# Patient Record
Sex: MALE | Race: WHITE | NOT HISPANIC OR LATINO | Employment: OTHER | URBAN - METROPOLITAN AREA
[De-identification: names, ages, dates, MRNs, and addresses within clinical notes are randomized per-mention and may not be internally consistent; named-entity substitution may affect disease eponyms.]

---

## 2024-05-28 ENCOUNTER — TRANSFERRED RECORDS (OUTPATIENT)
Dept: HEALTH INFORMATION MANAGEMENT | Facility: CLINIC | Age: 84
End: 2024-05-28
Payer: COMMERCIAL

## 2024-06-04 ENCOUNTER — MEDICAL CORRESPONDENCE (OUTPATIENT)
Dept: HEALTH INFORMATION MANAGEMENT | Facility: CLINIC | Age: 84
End: 2024-06-04
Payer: COMMERCIAL

## 2024-06-10 ENCOUNTER — TRANSCRIBE ORDERS (OUTPATIENT)
Dept: OTHER | Age: 84
End: 2024-06-10

## 2024-06-10 DIAGNOSIS — K58.1 IRRITABLE BOWEL SYNDROME WITH CONSTIPATION: Primary | ICD-10-CM

## 2024-06-11 ENCOUNTER — TELEPHONE (OUTPATIENT)
Dept: GASTROENTEROLOGY | Facility: CLINIC | Age: 84
End: 2024-06-11
Payer: COMMERCIAL

## 2024-06-11 ENCOUNTER — TELEPHONE (OUTPATIENT)
Dept: GASTROENTEROLOGY | Facility: CLINIC | Age: 84
End: 2024-06-11

## 2024-06-11 NOTE — TELEPHONE ENCOUNTER
[facility] Call [facility]    Phone Message    May a detailed message be left on voicemail: Yes    Reason for Call: Other: Patient is currently scheduled on 8/20/24, as visit type New GI Urgent. This is outside the expected timeline for this referral. Patient has been added to the waitlist.      Action Taken: Message routed to:  Other: GI REFERRAL TRIAGE POOL     Travel Screening: Not Applicable

## 2024-07-16 NOTE — TELEPHONE ENCOUNTER
REFERRAL INFORMATION:  Referring Provider:  Dr. Che   Referring Clinic:  [facility].   Reason for Visit/Diagnosis: K58.1 (ICD-10-CM) - Irritable bowel syndrome with constipation      FUTURE VISIT INFORMATION:  Appointment Date: 8/20/24  Appointment Time:      NOTES STATUS DETAILS   OFFICE NOTE from Referring Provider Care Everywhere 7/8/24, 5/23/24, 5/9/24 + more in epic   OFFICE NOTE from Other Specialist Care Everywhere OV- 5/28/23-Ewa WOOTEN CNP    OV 2/1/24, 1/24/24-Dr. Powell + more in [facility]   MEDICATION LIST Internal         ENDOSCOPY  Care Everywhere 8/18/23-[facility]   ERCP Care Everywhere 1/4/12, 10/18/11-[facility]   PERTINENT LABS Care Everywhere    IMAGING (CT, MRI, EGD, MRCP, Small Bowel Follow Through/SBT, MR/CT Enterography) Care Everywhere [facility]:    CT abd/pel-5/17/24, 7/25/23    CT chest 5/17/24    XR swallow 7/10/23, 5/16/23     Records Requested     July 16, 2024 11:02 AM  [email or web address]   Facility  [facility]   Outcome Requested images

## 2024-07-18 NOTE — TELEPHONE ENCOUNTER
[facility] Call Center    Phone Message    May a detailed message be left on voicemail: yes     Reason for Call: Other: Pt has called in to return call about potentially moving the Pt's appt. Please call back as soon as possible.     Action Taken: Message routed to:  [facility] ([facility] GI    Travel Screening: Not Applicable     Date of Service:

## 2024-07-22 ENCOUNTER — DOCUMENTATION ONLY (OUTPATIENT)
Dept: GASTROENTEROLOGY | Facility: CLINIC | Age: 84
End: 2024-07-22
Payer: COMMERCIAL

## 2024-07-22 NOTE — PROGRESS NOTES
Imaging disc received from [facility]    Scan: CT Chest without contrast, DOS: 5-15-24    Scan: Ct Abdomen and Pelvis with cont , DOS: 5-15-24    Scan: CT abdomen and pelvis , DOS: 7-24-23    Scan: xray video swallow, DOS: 7-10-23    Scan: xray video swallow, DOS: 5-16-23    CD sent to [facility] to be uploaded into PACS.     Mariana Long LPN

## 2024-07-24 NOTE — TELEPHONE ENCOUNTER
Writer called Pt back. Pt accepted an appointment with Bebe Montgomery on 7/25/2024 at 7 AM for an in-person clinic visit. The clinic was confirmed and verified with the Pt.

## 2024-07-25 ENCOUNTER — OFFICE VISIT (OUTPATIENT)
Dept: GASTROENTEROLOGY | Facility: CLINIC | Age: 84
End: 2024-07-25
Attending: INTERNAL MEDICINE
Payer: COMMERCIAL

## 2024-07-25 VITALS
HEIGHT: 73 IN | OXYGEN SATURATION: 98 % | SYSTOLIC BLOOD PRESSURE: 107 MMHG | WEIGHT: 153 LBS | DIASTOLIC BLOOD PRESSURE: 71 MMHG | BODY MASS INDEX: 20.28 KG/M2 | HEART RATE: 74 BPM

## 2024-07-25 DIAGNOSIS — R13.10 DYSPHAGIA, UNSPECIFIED TYPE: ICD-10-CM

## 2024-07-25 DIAGNOSIS — R10.84 ABDOMINAL PAIN, GENERALIZED: ICD-10-CM

## 2024-07-25 DIAGNOSIS — K59.00 CONSTIPATION, UNSPECIFIED CONSTIPATION TYPE: ICD-10-CM

## 2024-07-25 DIAGNOSIS — R63.4 WEIGHT LOSS: Primary | ICD-10-CM

## 2024-07-25 DIAGNOSIS — K58.1 IRRITABLE BOWEL SYNDROME WITH CONSTIPATION: ICD-10-CM

## 2024-07-25 PROCEDURE — 99204 OFFICE O/P NEW MOD 45 MIN: CPT | Performed by: PHYSICIAN ASSISTANT

## 2024-07-25 RX ORDER — PANTOPRAZOLE SODIUM 40 MG/1
40 TABLET, DELAYED RELEASE ORAL 2 TIMES DAILY
COMMUNITY
Start: 2024-03-14 | End: 2025-03-19

## 2024-07-25 RX ORDER — MULTIVIT WITH MINERALS/LUTEIN
1000 TABLET ORAL DAILY
COMMUNITY

## 2024-07-25 ASSESSMENT — PAIN SCALES - GENERAL: PAINLEVEL: NO PAIN (0)

## 2024-07-25 NOTE — PROGRESS NOTES
GI CLINIC VISIT    CC/REFERRING MD:  Alexx Che  REASON FOR CONSULTATION: K58.1 (ICD-10-CM) - Irritable bowel syndrome with constipation     ASSESSMENT/PLAN:  Alvin Parra is a 84 year old year old functionally independent male with PMHx significant for prostate cancer s/p prostatectomy who presents w/ wife (who helps supply some collateral history) to est care with the  [facility] team for dysphagia, anorexia, weight loss and constipation. Consult came in for IBS with constipation.       #Dysphagia x progressive since Jan 2023  Evidence of oropharyngeal dysphagia (at risk for aspiration) on VFSS 7/2023 and underwent therapy with SLP team. Also describing sx suggestive of progressive esophageal dysphagia. Given weight loss picture, most concerning would be mass/malignancy with ddx to include other structural esophageal changes (webs, rings, strictures), dysmotility disorder, esophagitis (though thought less likely given intermittent heartburn sx). Sx could also be compounded by underlying mood disorder.     -XR eso timed with tab, advised to be done before EGD  -Rec'd priority EGD after PAC eval for causes . Indication, R/B, potential complications explained to patient.     Future consideration  HRM with 24 hour impedence testing for further dysmotility disorders   Neurology consult - central process?     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 External CTC WO contrast with b/l pulm nodules with recommended follow up in 3 mo for low/high risk individuals . He will follow CTC findings with his primary. He does not recall his last colonoscopy. Sx are most concerning for malignancy of some sort. Ddx to include known DGBI, gastroparesis,SIBO, mood disorder,     -I recommended bidirectional scopes, priority, for further eval after PAC appt . Indication, R/B, potential  "complications explained to patient.   -dietary consult placed   -asked he start daily nutritional supplement   -asked he start daily miralax per AVS     Future consideration  Considering eval of stable appearing upper abd mass, likely MR abdomen to start   Further eval of constipation. TSH and metabolic panel both WNL in past 6 mo. Consider celiac serology, sitz marker study, possible pelvic floor eval     Orders Placed This Encounter   Procedures    XR Esophagram    Adult GI  Referral - Procedure Only    PAC Visit Referral (For [facility] Only)    Adult Nutrition  Referral     RTC 1-2 weeks after scopes     Thank you for this consultation.  It was a pleasure to participate in the care of this patient; please contact me with any further questions.     Bebe Montgomery PA-C  Code per complexity     HPI  Alvin Parra is a 84 year old year old male with PMHx of prostate cancer s/p prostatectomy presenting to seeing the [facility] GI group for weight loss, dysphagia, constipation.     1. GI history and weight loss    Since aged 18 reported GI troubles - sore stomach (mid abd), nausea, painful gas   -typically belches and burps and the pain goes away  -in past 12 mo, reports he lost 30# unintentionally, they feel this started after his COVID infection . Normal 180#, down to 153-155# now. Today at 153  -having abd pain that stops his meals , in addition to ongoing poor appetite   5/2024 - CTAP W contrast - w/ finding of a \"stable hypodense mass within the anterior upper abdomen/omentum. Partially   calcified gastric duplication cyst?\"  5/2024 - CTC WO contrast - b/l pulm nodules with recommendation for follow up CT in 3 mo for both low and high risk individuals   -brother and sister both with \"nervous stomachs\" but no known GI cancers     2. Dysphagia - new since summer 2023, progressive since then as well  -started as large pills then progressed to liquids and solids  -reports phlegm in his throat   -at times needs " to spit up the swallowed food as he feels it getting stuck  -intermittent heartburn that is responsive to OTC gaviscon, no known triggers   -started drooling about 1 year ago, this occurs independent of eating   -reports ongoing fatigue  -7/10/23 had an abnl VFSS and underwent some therapy, see their eval below  Impression   Oral phase of swallow: Mild impairment but improved from last study  Pharyngeal phase of swallow: Moderate impairment but slightly improved cricopharyngeal function from last study resulting in fewer residuals in pyriform sinus. Epiglottic inversion remains reduced resulting in significant residuals in valleculae.  Suspected esophageal dysphagia: yes; history of reflux  Overall, the patient displayed some improvement in his swallowing function, however he did aspirate during this study x1 and remains at risk of aspiration due to pharyngeal residuals. This likely contributes to production of phlegm.    Recommendations   The following recommendations are based upon today's assessment and may reduce, but cannot entirely eliminate this patient's risk of aspiration: Diet textures: Thin Liquids and Level 6 Soft & Bite Sized (NDD3)     Plan of Care   No further Speech intervention is recommended at this time due to limited improvements from previous swallow study and continued risk of aspiration.     3. Long-standing constipation that he reports had improved some after he increased his water intake   -not currently on any bowel regiment  -typically passing a BM every other day, short soft segments after straining  -no blood or black stools  CTAP W Contrast 5/2024 -   IMPRESSION:   1.   Stool distended rectum. Moderate to large amount of stool within the   colon. Does the patient have a history of constipation? Additional bowel   findings described above.   -He does not recall his last Cscope     Functionally independent  Works out 3x weekly  Volunteers at food shelter 2x weekly  Fatigue is affecting  QoL - unable to carry out IALDs as he normally would do - needing more breaks in between     Family Hx  No other known family history or GI related malignancy (esophageal, gastric, pancreatic, liver or colon) or family history of IBD/celiac disease.     Social Hx     No  use of NSAIDs or Tylenol.     1-2 drinks every other night of ETOH  No tobacco products.   No No recreational drug use.     PROBLEM LIST  There are no problems to display for this patient.      PERTINENT PAST MEDICAL HISTORY:  Past Medical History:   Diagnosis Date    H/O prostatectomy     Prostate cancer (H)        PREVIOUS SURGERIES:  Past Surgical History:   Procedure Laterality Date    PROSTATECTOMY PERINEAL  2011       ALLERGIES:     Allergies   Allergen Reactions    Atorvastatin     Azithromycin     Fenofibrate     Penicillins     Simvastatin        PERTINENT MEDICATIONS:    Current Outpatient Medications:     pantoprazole (PROTONIX) 40 MG EC tablet, Take 40 mg by mouth 2 times daily, Disp: , Rfl:     vitamin E (TOCOPHEROL) 1000 units (450 mg) CAPS capsule, Take 1,000 Units by mouth daily, Disp: , Rfl:     IBUPROFEN PO, Take by mouth as needed for moderate pain (Patient not taking: Reported on 7/25/2024), Disp: , Rfl:     VITAMIN D, CHOLECALCIFEROL, PO, Take by mouth daily (Patient not taking: Reported on 7/25/2024), Disp: , Rfl:     SOCIAL HISTORY:  Social History     Socioeconomic History    Marital status:      Spouse name: Not on file    Number of children: Not on file    Years of education: Not on file    Highest education level: Not on file   Occupational History    Not on file   Tobacco Use    Smoking status: Never    Smokeless tobacco: Never   Substance and Sexual Activity    Alcohol use: Yes     Comment: occasional    Drug use: No    Sexual activity: Not on file   Other Topics Concern    Not on file   Social History Narrative    Not on file     Social Determinants of Health     Financial Resource Strain: Not on file   Food  "Insecurity: Not on file   Transportation Needs: Not on file   Physical Activity: Sufficiently Active (2/18/2021)    [facility]    Exercise Vital Sign     Days of Exercise per Week: 3 days     Minutes of Exercise per Session: 90 min   Stress: Not on file   Social Connections: Not on file   Interpersonal Safety: Not on file   Housing Stability: Not on file       FAMILY HISTORY:  Family History   Problem Relation Age of Onset    Family History Negative Unknown        Past/family/social history reviewed and no changes    PHYSICAL EXAMINATION:  Vitals reviewed: /71   Pulse 74   Ht 1.854 m (6' 1\")   Wt 69.4 kg (153 lb)   SpO2 98%   BMI 20.19 kg/m    Wt:   Wt Readings from Last 2 Encounters:   07/25/24 69.4 kg (153 lb)   09/11/14 82.6 kg (182 lb)      Constitutional: aaox3, cooperative, pleasant, not dyspneic/diaphoretic, no acute distress  Eyes: Sclera anicteric/injected  Ears/nose/mouth/throat: hearing intact  Neck: supple, active ROM w/o limitation or pain   CV: No edema  Respiratory: Unlabored breathing  Abd:  Nondistended, +bs, no hepatosplenomegaly, nontender, no peritoneal signs  Skin: warm, perfused, no jaundice  Psych: Normal affect  MSK: Normal gait     PERTINENT STUDIES:    No results found for any previous visit.        No results found for: \"WBC\", \"HGB\", \"PLT\", \"CHOL\", \"TRIG\", \"HDL\", \"LDLDIRECT\", \"ALT\", \"AST\", \"NA\", \"CREATININE\", \"BUN\", \"CO2\", \"TSH\", \"INR\", \"GLUF\"     Liver Function Studies - No results for input(s): \"PROTTOTAL\", \"ALBUMIN\", \"BILITOTAL\", \"ALKPHOS\", \"AST\", \"ALT\", \"BILIDIRECT\" in the last 92019 hours.     517/24  Liver: Unremarkable.   Gallbladder and bile ducts: The patient has had a cholecystectomy. There is a   mild, expected degree of intrahepatic bile duct and common duct dilation post   cholecystectomy which appears similar to 7/24/2023.   Pancreas: Unremarkable.   Spleen: Unremarkable spleen. An accessory splenule is present.   Adrenal glands: " Unremarkable adrenal glands.   Kidneys and ureters: Right kidney: There is persistent dilatation of the   extrarenal pelvis which may be due to chronic ureteropelvic junction   obstruction. Otherwise unremarkable.The ureter is poorly seen. Left kidney:   Unremarkable. Visualized ureter appears unremarkable.   Stomach and bowel: There are several surgical clips at the gastroesophageal   junction.Borderline distended distal descending duodenum/proximal 3rd portion   of the duodenum.Narrowing of the 3rd portion of the duodenum between the   abdominal aorta and the SMA/SMV without evidence for high-grade obstruction at   this level. There is enteric contrast within the small bowel. Rectum is   distended with stool measuring up to 7.8 cm. There is colonic diverticulosis.   Moderate to large amount of stool within the colon.   Appendix: Not visualized. Reportedly appendectomy.     Intraperitoneal space: Within the anterior abdomen/omentum there is a nodular   density with marginal calcification measuring 2.5 x 1.5 x 3 cm which appears   stable on axial image 51/4. This is contiguous with the anterior inferior   distal stomach also similar to the prior study.   Vasculature: Atherosclerotic changes to include the nonaneurysmal abdominal   aorta. Atherosclerotic and stenotic origin of the celiac axis.   Lymph nodes: Unremarkable.   Urinary bladder: Unremarkable.   Reproductive: Absent prostate gland. Several surgical clips within the pelvis.   Bones/joints: There are degenerative changes within the spine. Stable grade 1   L4-L5 spondylolisthesis. Stable mild wedging of L1. Spinal stenosis L4-L5.   Soft tissues: Small fatty bilateral inguinal hernias.     IMPRESSION:   1.   Stool distended rectum. Moderate to large amount of stool within the   colon. Does the patient have a history of constipation? Additional bowel   findings described above.   2.   Absent prostate.   3.   Stable hypodense mass within the anterior upper  abdomen/omentum. Partially   calcified gastric duplication cyst?   4.   Right renal changes described above.   5.   Additional non-critical/less critical findings as described in the report.     ULTRASOUND-GUIDED PERCUTANEOUS ABDOMINAL MASS BIOPSY: 8/8/2023 10:05 CDT   INDICATION: ICD-10 R19.00 Abdominal mass, unspecified abdominal location   IMPRESSION: Successful ultrasound-guided abdominal wall mass percutaneous core biopsy without complication. The mass appeared to be in the region of the patient's previous hernia repair scar and could reflect a region of fat necrosis. Biopsy results will dictate further management.       PREVIOUS ENDOSCOPY    No results found for this or any previous visit.

## 2024-07-25 NOTE — PATIENT INSTRUCTIONS
It was a pleasure taking care of you today.  I've included a brief summary of our discussion and care plan from today's visit below.  Please review this information with your primary care provider.  _______________________________________________________________________    My recommendations are summarized as follows:    Order x-ray of esophagus, please get this done before the upper endoscopy. Call to schedule at [phone]   Ordering upper and lower endoscopy - please call to schedule at [phone] option 2  Agree with increasing water intake as you are  If you do OK on it (do not develop pain,diarrhea,bloat), OK to add on daily boost/ensure as well   Referral to dietary - this may not be covered by insurance. Please call your plan to clarify - thank you   It would be OK to add on a low dose daily Miralax such as 0.5 caps a day to help with the stooling     Please call our clinic or send a [employer] message to us if you have any questions or concerns. MyChart messages are answered by your nurse or doctor typically within 24 hours.  Please allow extra time on weekends and holidays    Return to GI Clinic in 1-2 weeks after procedures to review your progress.    _______________________________________________________________________    How do I schedule labs, imaging studies, or procedures that were ordered in clinic today?      Labs: To schedule lab appointment at the [facility], use my chart or call [phone]. If you have a [place] lab closer to home where you are regularly seen you can give them a call.      Procedures: If a colonoscopy, upper endoscopy, breath test, esophageal manometry, or pH impedence was ordered today, our endoscopy team will call you to schedule this. If you have not heard from our endoscopy team within a week, please call [phone] option 2 to schedule.      Imaging Studies: If you were scheduled for a CT scan, X-ray, MRI, ultrasound, HIDA scan or other imaging  study, please call [phone] to have this scheduled.      Referral: If a referral to another specialty was ordered, expect a phone call or follow instructions above. If you have not heard from anyone regarding your referral in a week, please call our clinic to check the status.      Who do I call with any questions after my visit?  Please be in touch if there are any further questions that arise following today's visit.  There are multiple ways to contact your gastroenterology care team.       During business hours, you may reach a Gastroenterology nurse at [phone]     To schedule or reschedule an appointment, please call [phone].      You can always send a secure message through [employer].  [employer] messages are answered by your nurse or doctor typically within 24 hours.  Please allow extra time on weekends and holidays.       For urgent/emergent questions after business hours, you may reach the on-call GI Fellow by contacting the [facility]  at [phone].     How will I get the results of any tests ordered?    You will receive all of your results.  If you have signed up for [employer], any tests ordered at your visit will be available to you after your physician reviews them.  Typically this takes 1-2 weeks.  If there are urgent results that require a change in your care plan, your physician or nurse will call you to discuss the next steps.       What is [employer]?  [employer] is a secure way for you to access all of your healthcare records from the [facility].  It is a web based computer program, so you can sign on to it from any location.  It also allows you to send secure messages to your care team.  I recommend signing up for [employer] access if you have not already done so and are comfortable with using a computer.       How to I schedule a follow-up visit?  If you did not schedule a follow-up visit today, please call [phone] to schedule a follow-up office visit.       Sincerely,    Bebe Montgomery PA-C  Gastroenterology

## 2024-07-25 NOTE — NURSING NOTE
"Chief Complaint   Patient presents with    New Patient       Vitals:    07/25/24 0654   BP: 107/71   Pulse: 74   SpO2: 98%   Weight: 69.4 kg (153 lb)   Height: 1.854 m (6' 1\")       Body mass index is 20.19 kg/m .    [employer]    "

## 2024-07-29 ENCOUNTER — TELEPHONE (OUTPATIENT)
Dept: GASTROENTEROLOGY | Facility: CLINIC | Age: 84
End: 2024-07-29
Payer: COMMERCIAL

## 2024-07-29 NOTE — TELEPHONE ENCOUNTER
"Endoscopy Scheduling Screen    Have you had a positive Covid test in the last 14 days?  No    What is your communication preference for Instructions and/or Bowel Prep?   Mail/USPS    What insurance is in the chart?  Other:  BCBS    Ordering/Referring Provider:     ERIBERTO SALGUERO      (If ordering provider performs procedure, schedule with ordering provider unless otherwise instructed. )    BMI: Estimated body mass index is 20.19 kg/m  as calculated from the following:    Height as of 7/25/24: 1.854 m (6' 1\").    Weight as of 7/25/24: 69.4 kg (153 lb).     Sedation Ordered  MAC/deep sedation.   BMI<= 45 45 < BMI <= 48 48 < BMI < = 50  BMI > 50   No Restrictions No [facility]  No [facility]  [facility] [facility] with exceptions Hospital Only OR Only       Do you have a history of malignant hyperthermia?  No    (Females) Are you currently pregnant?        Have you been diagnosed or told you have pulmonary hypertension?   No    Do you have an LVAD?  No    Have you been told you have moderate to severe sleep apnea?  No    Have you been told you have COPD, asthma, or any other lung disease?  No    Do you have any heart conditions?  No     Have you ever had or are you waiting for an organ transplant?  No. Continue scheduling, no site restrictions.    Have you had a stroke or transient ischemic attack (TIA aka \"mini stroke\" in the last 6 months?   No    Have you been diagnosed with or been told you have cirrhosis of the liver?   No    Are you currently on dialysis?   No    Do you need assistance transferring?   No    BMI: Estimated body mass index is 20.19 kg/m  as calculated from the following:    Height as of 7/25/24: 1.854 m (6' 1\").    Weight as of 7/25/24: 69.4 kg (153 lb).     Is patients BMI > 40 and scheduling location [facility]?  No    Do you take an injectable medication for weight loss or diabetes (excluding insulin)?  No    Do you take the medication Naltrexone?  No    Do you take blood thinners?  No       Prep   Are you currently on " dialysis or do you have chronic kidney disease?  No    Do you have a diagnosis of diabetes?  No    Do you have a diagnosis of cystic fibrosis (CF)?  No    On a regular basis do you go 3 -5 days between bowel movements?  No    BMI > 40?  No    Preferred Pharmacy:    [id number] - [place], [place] - [address]  [address] [place] [address]  Phone: [phone] Fax: [phone]      Final Scheduling Details     Procedure scheduled  Colonoscopy / Upper endoscopy (EGD)    Surgeon:       Date of procedure: ON WAITLIST PER THISA       Pre-OP / PAC:       Location       Sedation          Patient Reminders:   You will receive a call from a Nurse to review instructions and health history.  This assessment must be completed prior to your procedure.  Failure to complete the Nurse assessment may result in the procedure being cancelled.      On the day of your procedure, please designate an adult(s) who can drive you home stay with you for the next 24 hours. The medicines used in the exam will make you sleepy. You will not be able to drive.      You cannot take public transportation, ride share services, or non-medical taxi service without a responsible caregiver.  Medical transport services are allowed with the requirement that a responsible caregiver will receive you at your destination.  We require that drivers and caregivers are confirmed prior to your procedure.

## 2024-07-30 ENCOUNTER — ANCILLARY PROCEDURE (OUTPATIENT)
Dept: GENERAL RADIOLOGY | Facility: CLINIC | Age: 84
End: 2024-07-30
Attending: PHYSICIAN ASSISTANT
Payer: COMMERCIAL

## 2024-07-30 DIAGNOSIS — K59.00 CONSTIPATION, UNSPECIFIED CONSTIPATION TYPE: ICD-10-CM

## 2024-07-30 DIAGNOSIS — R10.84 ABDOMINAL PAIN, GENERALIZED: ICD-10-CM

## 2024-07-30 DIAGNOSIS — R63.4 WEIGHT LOSS: ICD-10-CM

## 2024-07-30 DIAGNOSIS — R13.10 DYSPHAGIA, UNSPECIFIED TYPE: ICD-10-CM

## 2024-07-30 PROCEDURE — 74220 X-RAY XM ESOPHAGUS 1CNTRST: CPT | Mod: GC | Performed by: RADIOLOGY

## 2024-08-02 DIAGNOSIS — R13.10 DYSPHAGIA, UNSPECIFIED TYPE: Primary | ICD-10-CM

## 2024-08-05 ENCOUNTER — PATIENT OUTREACH (OUTPATIENT)
Dept: GASTROENTEROLOGY | Facility: CLINIC | Age: 84
End: 2024-08-05
Payer: COMMERCIAL

## 2024-08-05 NOTE — PROGRESS NOTES
Received the below message from Bebe WONG     Can you let the patient know the structures of the esophagus looked good. He was not able to perform the tablet study.     I want to get a video swallow study to check the initial swallow. He will be contacted for an appt.     Please remind him to schedule the upper endoscopy.     Left a voicemail for Alvin and provided call back number

## 2024-08-05 NOTE — PROGRESS NOTES
Received a call back from Alvin and provided the results from Bebe WONG.   He will call to schedule the procedures

## 2024-08-20 ENCOUNTER — PRE VISIT (OUTPATIENT)
Dept: GASTROENTEROLOGY | Facility: CLINIC | Age: 84
End: 2024-08-20

## 2024-09-06 ENCOUNTER — ANCILLARY PROCEDURE (OUTPATIENT)
Dept: GENERAL RADIOLOGY | Facility: CLINIC | Age: 84
End: 2024-09-06
Attending: PHYSICIAN ASSISTANT
Payer: COMMERCIAL

## 2024-09-06 ENCOUNTER — THERAPY VISIT (OUTPATIENT)
Dept: SPEECH THERAPY | Facility: CLINIC | Age: 84
End: 2024-09-06
Attending: PHYSICIAN ASSISTANT
Payer: COMMERCIAL

## 2024-09-06 ENCOUNTER — PATIENT OUTREACH (OUTPATIENT)
Dept: GASTROENTEROLOGY | Facility: CLINIC | Age: 84
End: 2024-09-06

## 2024-09-06 ENCOUNTER — TELEPHONE (OUTPATIENT)
Dept: GASTROENTEROLOGY | Facility: CLINIC | Age: 84
End: 2024-09-06

## 2024-09-06 DIAGNOSIS — R13.10 DYSPHAGIA, UNSPECIFIED TYPE: ICD-10-CM

## 2024-09-06 DIAGNOSIS — R10.9 ABDOMINAL PAIN, UNSPECIFIED ABDOMINAL LOCATION: ICD-10-CM

## 2024-09-06 DIAGNOSIS — R63.4 WEIGHT LOSS: Primary | ICD-10-CM

## 2024-09-06 DIAGNOSIS — K59.00 CONSTIPATION, UNSPECIFIED CONSTIPATION TYPE: ICD-10-CM

## 2024-09-06 PROCEDURE — 74230 X-RAY XM SWLNG FUNCJ C+: CPT | Mod: GC | Performed by: STUDENT IN AN ORGANIZED HEALTH CARE EDUCATION/TRAINING PROGRAM

## 2024-09-06 PROCEDURE — 92611 MOTION FLUOROSCOPY/SWALLOW: CPT | Mod: GN | Performed by: SPEECH-LANGUAGE PATHOLOGIST

## 2024-09-06 RX ORDER — BARIUM SULFATE 400 MG/ML
SUSPENSION ORAL ONCE
Status: COMPLETED | OUTPATIENT
Start: 2024-09-06 | End: 2024-09-06

## 2024-09-06 RX ADMIN — BARIUM SULFATE 240 ML: 400 SUSPENSION ORAL at 10:13

## 2024-09-06 NOTE — PROGRESS NOTES
SPEECH LANGUAGE PATHOLOGY EVALUATION        Fall Risk Screen:  Fall screen completed by: SLP  Have you fallen 2 or more times in the past year?: No  Have you fallen and had an injury in the past year?: No  Is patient a fall risk?: No    Subjective      Presenting condition or subjective complaint:  Pt is an 84 year old male that was seen by speech therapy today for a video swallow study. Pt was referred by GI. Pt has had two video swallow studies in 2023. He did participate in swallow therapy (vital stim) in 2023 and he did not feel improvement. Pt reports increased phlegm and occasional drooling. Pt only avoids spicy foods. He has lost 30lbs in the last year. Pt reports he starts eating and gets full. Pt reports he coughs all the time. He denies any recent upper respiratory infection. He believes his swallowing difficulties started with Covid in 2023. Pt reports that he does have difficulty swallowing large pills.     Date of onset: 09/06/24    Relevant medical history:   PMHx significant for prostate cancer s/p prostatectomy   Dates & types of surgery:      Prior diagnostic imaging/testing results:       VFSS 5/16/2023- IMPRESSION:   1. Mild retention of all fluid and food consistencies in the vallecula and piriform sinus after swallowing.   2. Cricopharyngeus dysfunction     VFSS 7/10/2023- IMPRESSION: No evidence of laryngeal penetration or aspiration with any of the test consistencies. Moderate vallecular stasis. This didn't get clear to some degree with swallowing. Please see separate dedicated speech pathology report for full details and dietary recommendations.     Prior therapy history for the same diagnosis, illness or injury:        Living Environment  Social support:     Help at home:    Equipment owned:       Employment:      Hobbies/Interests:      Patient goals for therapy:      Pain assessment: Pain denied     Objective     SWALLOW EVALUTION  Current Diet/Method of Nutritional Intake: thin liquids  (level 0), regular diet      VIDEOFLUOROSCOPIC SWALLOW STUDY  Radiologist: resident   Views Taken: left lateral, A/P   Physical location of procedure: [facility]  Patient sitting upright on chair/stool     VFSS textures trialed:   VFSS Eval: Thin Liquids  Mode of Presentation: cup, self-fed   Order of Presentation: 1, 2, 6, 9, 13 (AP)  Preparatory Phase: WFL  Oral Phase: WFL  Bolus Location When Swallow Initiated: posterior laryngeal surface of epiglottis  Pharyngeal Phase: impaired hyolaryngel excursion, impaired epiglottic movement, impaired pharyngoesophageal segment opening, impaired tongue base retraction, pharyngeal wall coating, residue in valleculae, residue in pyriform sinus  Rosenbeck's Penetration Aspiration Scale: 3 - contrast remains above the vocal cords, visible residue remains (penetration)  Response to Aspiration: absent response  Strategies and Compensations: not applicable  Diagnostic Statement: Decreased base of tongue. Noted prominent CP that decreases bolus flow through the UES. Resulting in penetration during the swallow and moderate residue in the vallecular and pyriforms. No aspiration. On AP, noted prominent narrowing at UES.     VFSS Eval: Mildly Thick Liquids  Mode of Presentation: cup, self-fed   Order of Presentation: 3  Preparatory Phase: WFL  Oral Phase: WFL  Bolus Location When Swallow Initiated: posterior angle of ramus  Pharyngeal Phase: impaired hyolaryngel excursion, impaired epiglottic movement, impaired pharyngoesophageal segment opening, impaired tongue base retraction, pharyngeal wall coating, residue in valleculae, residue in pyriform sinus  Rosenbeck's Penetration Aspiration Scale: 3 - contrast remains above the vocal cords, visible residue remains (penetration)  Response to Aspiration: absent response  Strategies and Compensations: not applicable  Diagnostic Statement:  Decreased base of tongue. Noted prominent CP that decreases bolus flow through the UES.  Resulting in penetration during the swallow and moderate residue in the vallecular and pyriforms. No aspiration.    VFSS Eval: Purees  Mode of Presentation: spoon, self-fed   Order of Presentation: 4, 5, 12 (AP)  Preparatory Phase: WFL  Oral Phase: WFL  Bolus Location When Swallow Initiated: valleculae  Pharyngeal Phase: impaired hyolaryngel excursion, impaired epiglottic movement, impaired pharyngoesophageal segment opening, impaired tongue base retraction, pharyngeal wall coating, residue in valleculae, residue in pyriform sinus  Rosenbeck s Penetration Aspiration Scale: 1 - no aspiration, contrast does not enter airway  Response to Aspiration: absent response  Strategies and Compensations: not applicable  Diagnostic Statement: Decreased base of tongue strength.  Noted prominent CP that decreases bolus flow through the UES. Moderate-severe residue in vallecular space down lateral channels to the UES. No penetration or aspiration.     VFSS Eval: Solids  Mode of Presentation: self-fed   Order of Presentation: 7, 8  Preparatory Phase: WFL  Oral Phase: WFL  Bolus Location When Swallow Initiated: posterior laryngeal surface of epiglottis  Pharyngeal Phase: impaired hyolaryngel excursion, impaired epiglottic movement, impaired pharyngoesophageal segment opening, impaired tongue base retraction, pharyngeal wall coating, residue in valleculae, residue in pyriform sinus   Rosenbeck s Penetration Aspiration Scale: 1 - no aspiration, contrast does not enter airway  Response to Aspiration: absent response  Strategies and Compensations: not applicable  Diagnostic Statement: Decreased base of tongue strength.  Noted prominent CP that decreases bolus flow through the UES. Moderate-severe residue in vallecular space down lateral channels to the UES. No penetration or aspiration.     VFSS Eval: Barium Tablet  Mode of Presentation: cup   Order of Presentation: 10, 11  Preparatory Phase: prolonged bolus preparation  Oral Phase:  impaired AP movement  Diagnostic Statement: Pt struggles to move pill AP in oral cavity. First attempt at 1/2 barium pill pt had to spit the pill out. On second attempt, pt was able to pass 1/2 barium table with apple sauce. Briefly suspended at UES.      ESOPHAGEAL PHASE OF SWALLOW  patient reports symptoms of esophageal dysphagia  patient presents with symptoms of esophageal dysphagia  esophageal sweep performed during today's videofluoroscopic exam  please refer to radiologist's report for details     SWALLOW ASSESSMENT CLINICAL IMPRESSIONS AND RATIONALE  Diet Consistency Recommendations: thin liquids (level 0), easy to chew (level 7)    Recommended Feeding/Eating Techniques: small bolus size, slow rate of intake, alternate food and liquid intake   Medication Administration Recommendations: pills with water or puree carrier  Instrumental Assessment Recommendations:  none      Assessment & Plan   CLINICAL IMPRESSIONS   Medical Diagnosis: Oropharyngeal dysphagia    Treatment Diagnosis: Oropharyngeal dysphagia   Impression/Assessment: Pt is a 84 year old male with swallowing complaints. The following significant findings have been identified: impaired swallowing, characterized by decreased base of tongue strength, decreased UES opening resulting in regurgitation of bolus, as well as decreased passage of bolus through oropharynx.  Moderate vallecular and pyriform residue. Pt had 2-3 swallows per bite/sip. Liquid wash results in penetration with thin liquids. No aspiration was observed today. Pt presents with inefficient swallow function. Identified deficits interfere with their ability to consume an oral diet and maintain nutrition as compared to previous level of function.Recommend soft, bite sized diet for increased efficiency of eating with thin liquids. Recommend ongoing use of safe swallow strategies, as well as choosing high protein shakes and increasing fluid intake to assist with fatigue while eating.  Recommend referral back to GI or ENT for UES dilation. Pt and wife were educated on the results of the swallow assessment today. Discussed reviewing VFSS at multidisciplinary swallow rounds and to follow up with treatment options. Pt and wife report understanding and agree with the plan of care.     PLAN OF CARE  Treatment Interventions:  Eval only     Recommended Referrals to Other Professionals:  GI and/or ENT  Education Assessment:   Learner/Method: Patient;Significant Other  Education Comments: Report understanding of information    Risks and benefits of evaluation/treatment have been explained.   Patient/Family/caregiver agrees with Plan of Care.     Evaluation Time:    SLP Eval: VideoFluoroscopic Swallow function Minutes (27038): 45         Signing Clinician: Barb BLACK [facility]                                                                                   OUTPATIENT SPEECH LANGUAGE PATHOLOGY

## 2024-09-06 NOTE — TELEPHONE ENCOUNTER
Received a call from Pinky Geiger(RN) inquiring about scheduling the pt's EGD/Colonoscopy. After reviewing the pt's chart we reached out to Bebe Montgomery PA-C to verify requirements for a PAC.     Per Bebe TURK: We no longer need a PAC to be done prior to the EGD/Colonoscopy. To ensure timely care we will be splitting the procedures to different days.     Messaging Dr. Beckwith to verify she can dilate the pt's esophageal sphincter.    Once approval has been given I will schedule pt to complete their EGD on 09/25 [facility].

## 2024-09-06 NOTE — TELEPHONE ENCOUNTER
1st attempt to schedule post [facility] review. LVM for patient to call back.     Slots on hold: 09/11/2024 [facility]   09/25 [facility] (Waiting for [facility] approval).    Schedule with MAC, No need for PAC eval per Bebe Montgomery.

## 2024-09-06 NOTE — LETTER
2024      Alvin Parra  [address]  [place] [address]              Low Volume Extended Bowel Prep  Prep instructions for your colonoscopy   For prep questions, please call: [facility] [address] [place] [address], [address]th Floor, [place] [address] - [phone] option 4    Bowel prep was sent 2024 to   [id number] - [place], [place] - [address]     Please read these instructions carefully at least 7 days prior to your colonoscopy procedure. Be sure to follow all directions completely. The inside of your colon must be clean to allow for a complete examination for the presence of any growths, polyps, and/or abnormalities, as well as their biopsy or removal. Several tips are included in order to make this part of the procedure as comfortable as possible.    Getting ready      prescription at the pharmacy. Endoscopy team will order this about 2 weeks before to your scheduled procedure. Included in the kit will be the followin  Dulcolax (Bisacodyl) 5mg tablets  8.3 ounce bottle of Miralax powder (ClearLAX, SmoothLAX, PowderLAX)  One container of Polyethylene glycol (Golytely, Colyte, Nulytely, Gavilyte-G)    A nurse will call you to go over your appointment details and prep instructions. Not completing the nurse call could result with your appointment being canceled.    You must arrange for an adult to drive you home after your exam. Your colonoscopy cannot be done unless you have a ride. If you need to use public transportation, someone must ride with you and stay with you for up to 24 hours.       7 days before procedure     Consult with your prescribing provider about stopping any:     Diabetic/Weight Loss Injectable Medication GLP-1 agonist (such as Exenatide (Byetta, Bydureon),  Mounjaro (Tirzepatide).  Ozempic (Semaglutide). Semaglutide. Symlin (Pamlintide), Tanzeum (Albiglutide). Tirzepatide-Weight Management (Zepbound), Trulicity  (Dulaglutide), Victoza (Saxenda, Liraglutide), Wegovy (Semaglutide) please follow below guidelines for holding:    For weekly injection HOLD 7 days before procedure.  For once or twice a day injection HOLD the day before procedure and day of procedure.  For oral, daily dosing (Rybelsus) HOLD 7 days before procedure.    Blood thinning and/or anti platelet medications: such as Coumadin, Plavix, Xarelto, Eliquis, Lovenox or others, these medications may need to be stopped temporarily before your procedure.     If you take insulin for diabetes, ask your prescribing provider for instructions on how to manage this medication while preparing for a colonoscopy.     NSAIDs medications such as Sulindac, Celebrex, Mobic, Relafen, or others may need to be stopped before the procedure. This will be discussed during nurse review call, or you can reach out to your prescribing provider.     Stop taking iron (ferrous sulfate), multivitamins that contain iron, and/or fiber supplements (Metamucil, Benefiber, Psyllium husk powder, Fibercon, Bran, etc.).      Stop eating whole kernel corn, popcorn, nuts, and foods that contain seeds. These can stay in the colon for many days, and they can clog up the colonoscope.    Begin a low-fiber diet. (See examples below). No Olestra (a fat substitute).   Consume no more than 10-15 grams of fiber each day.    Take 1 capful (17g) of Miralax mixed with 8 oz of a clear liquid twice daily for 7 days prior to your scheduled procedure.       LOW FIBER DIET   You may have: Do not have:    Starches: White bread, rolls, biscuits, croissants, Clyo toast, white flour tortillas, waffles, pancakes, [place]an toast; white rice, noodles, pasta, macaroni; cooked and peeled potatoes; plain crackers, saltines; cooked farina or cream of rice; puffed rice, corn flakes, Rice Krispies, Special K      Vegetables: tender cooked and canned, vegetable broths     Fruits and fruit juices: Strained fruit juice, canned fruit  without seeds or skin (not pineapple), applesauce, pear sauce, ripe bananas, melons (not watermelon)     Milk products: Milk (plain or flavored), cheese, cottage cheese, yogurt (no berries), custard, ice cream       Proteins: Tender, well-cooked ground beef, lamb, veal, ham, pork, chicken, turkey, fish or organ meat, Tofu, eggs, creamy peanut butter      Fats and condiments: Margarine, butter, oils, mayonnaise, sour cream, salad dressing, plain gravy; spices, cooked herbs; sugar, clear jelly, honey, syrup      Snacks, sweets and drinks: Pretzels, hard candy; plain cakes and cookies (no nuts or seeds); gelatin, plain pudding, sherbet, Popsicles; coffee, tea, carbonated ( fizzy ) drinks     Starches: Breads or rolls that contain nuts, seeds or fruit; whole wheat or whole grain breads that contain more than 2 grams of fiber per serving; cornbread; corn or whole wheat tortillas; potatoes with skin; brown rice, wild rice, quinoa, kasha (buckwheat), and oatmeal      Vegetables: Any raw or steamed vegetables; vegetables with seeds; corn in any form      Fruits and fruit juices: Prunes, prune juice, raisins and other dried fruits, berries and other fruits with seeds, canned pineapple juices with pulp such as orange, grapefruit, pineapple or tomato juice     Milk products: Any yogurt with nuts, seeds or berries      Proteins: Tough, fibrous meats with gristle; cooked dried beans, peas or lentils; crunchy peanut butter     Fats and condiments: Pickles, olives, relish, horseradish; jam, marmalade, preserves      Snacks, sweets and drinks: Popcorn, nuts, seeds, granola, coconut, candies made with nuts or seeds; all desserts that contain nuts, seeds, raisins and other dried fruits, coconut, whole grains or bran.          1 day before procedure     Start a clear liquid diet (see examples below). Do not eat any solid food.      Drink at least eight to ten 8-ounce glasses of water throughout the day. ? ? ? ? ? ? ? ?    Stop taking  NSAIDs pain relievers, such as Advil, Ibuprofen, Motrin, etc. You may take Tylenol.    Fill container of Golytely with a full gallon of warm water. Cover and shake until well mixed. Chill in refrigerator until it is time to drink solution.     CLEAR LIQUID DIET:  You can have: Do not have:    Water, tea, coffee (no milk or cream)   Soda pop, Gatorade (not red or purple)   Coconut water   Jell-O, Popsicles (no milk or fruit pieces - not red or purple)   Fat-free soup broth or bouillon   Plain hard candy, such as clear life savers (not red or purple)   Clear juices and fruit-flavored drinks, such as apple juice, white grape juice, Hi-C, and Joel-Aid (not red or purple)  Milk or milk products such as ice cream, malts or shakes, or coffee creamer   Red or purple drinks of any kind such as cranberry juice, grape juice or Joel-Aid. Avoid red or purple Jell-O, Popsicles, sorbet, sherbet and candy   Juices with pulp such as orange, grapefruit, pineapple or tomato juice   Cream soups of any kind   Alcohol and beer   Protein drinks or protein powder     Step 1     At 3 PM, take 2 Dulcolax (Bisacodyl) tablets.   At 6 PM, start drinking one 8-ounce glass of Golytely mixture every 15 minutes until the container is HALF empty. Drink each glass quickly. Store the rest in the refrigerator.   Continue to drink clear liquids    Step 2     If you arrive for your procedure BEFORE 11 AM:  At 11 PM, take 2 Dulcolax (Bisacodyl) tablets.  At 11 PM, start drinking the other half of the Golytely container. Drink one 8-ounce glass every 15 minutes until the container is empty. Drink each glass quickly.    If you arrive for your procedure AFTER 11 AM:  At 6 AM, take 2 Dulcolax (Bisacodyl) tablets.  At 6 AM, start drinking the other half of the Golytely container. Drink one 8-ounce glass of Golytely mixture every 15 minutes until the container is empty. Drink each glass quickly.     Reminders While Drinking Laxatives:     After you start  drinking the solution, stay near a toilet. You may have watery stools (diarrhea), mild cramping, bloating, and nausea. You may want to use Vaseline on the skin around your anus after each bowel movement or use wet wipes to prevent irritation. Bowel movements will be liquid and dark in color at first and then should turn clear yellow in color. Drinking the prepared solution may make you cold, wearing warm clothing can be helpful.    Some find it helpful to:  For added flavor, include a crystal light lemonade packet in each glass of Golytely, rather than mixing it into the entire prepared mixture.  In between each glass, try sucking on a lemon or a piece of hard candy to help diminish the flavor of the preparation.  Drinking from a straw can help minimize the taste of the prepared mixture.    If you have nausea or vomiting during drinking the solution, rinse your mouth with water and take a 15-30 minute break and then continue drinking solution.         Day of procedure     2 hours before your arrival time stop drinking all liquids, including water.   Do not smoke or swallow anything, including water or gum for at least 2 hours before your arrival time. This is a safety issue. Your procedure could be cancelled if you do not follow directions.  No chewing tobacco 6 hours prior to procedure arrival time.     You may take your necessary morning medications with sips of water (4 ounces).   Do not take diabetes medicine by mouth until after your exam.  If you have asthma, bring your inhalers.  Please perform your nebulizer treatments and airway clearance therapy in the morning prior to the procedure (if applicable).    Arrive with a responsible adult who can drive you home and stay with you for up to 24 hours. The medications used during the procedure will make you sleepy, so you won't be able to drive yourself home.   You cannot use public transportation, ride-share services, or non-medical taxi services without a  responsible caregiver. Medical transport services are okay, but a caregiver must be there to receive you at your destination.  Please check in with your  when you arrive. Drivers should stay on campus.    Expect to be at the procedure center for about 1.5-2.5 hours.    Do not wear jewelry (i.e. earrings, rings, necklaces, watches, etc.). Leave your purse, billfold, credit cards, and other valuables at home.      Bring insurance card and ID.     Answers to Commonly Asked Questions     How soon can I eat after the procedure?  You may resume your normal diet when you feel ready, unless advised otherwise by the doctor performing your procedure. We recommend starting with a light meal.   Do not drink alcohol for 24 hours after your procedure.  You may resume normal activities (work, exercise, etc.) after 24 hours.    How will I feel after the procedure?  It is normal to feel bloated and gassy after your procedure. Walking will help move the air through your colon. You can take non-aspirin pain relievers that contain acetaminophen (Tylenol).  If you are having sedation, we require a responsible adult to take you home for your safety. The sedation medicines used to relax you during the procedure can impair your judgement and reaction time, and make you forgetful and possibly a little unsteady.  Do not drive, make any important decisions, or sign any legal documents for 24 hours after your procedure.    When will I get my test results?  You should have your procedure results and any lab results (if applicable) by letter, MyChart message, or phone call within 2 weeks. If you have any questions, please call the doctor that referred you for the procedure.    How do I know if my colon is cleaned out?   After completing the bowel prep, your bowel movements should be all liquid and yellow. Your bowel movements will look similar to urine in the toilet. If there are pieces of stool (poop) in the toilet, or if you can't see to  the bottom of the toilet, please call our office for advice. Call [phone] and ask to speak with a nurse.    Why is the Golytely bowel prep taken in several steps?   The stool is flushed out by a large wave of fluid going through the colon. Just sipping a large volume of the solution will not achieve the desired result. Studies have shown that two smaller waves (or more in some cases) are better than one large one.      What if I need to cancel or reschedule my procedure?  Contact our endoscopy scheduling team at [phone], option 2. Monday through Friday, 7:00am-5:00pm.

## 2024-09-06 NOTE — PROGRESS NOTES
Received a message from the GI provider regarding the scheduling of the EGD and colonoscopy.   Chart reviewed and patient is on the wait list for the [facility].   Spoke with endo scheduling and EGD can be done sooner if not combined with the colonoscopy. Noted patient lives 3 hours away.   Spoke with Bebe WONG while on the phone with endo scheduling and ok to do the EGD and colonoscopy at different times and EGD is the priority, may also forgo the PAC appointment.   Endo scheduling is going to message the provider about doing the scope on 9-25 based on the results of the appointment today with

## 2024-09-09 RX ORDER — POLYETHYLENE GLYCOL 3350 17 G/17G
POWDER, FOR SOLUTION ORAL
Qty: 238 G | Refills: 0 | Status: SHIPPED | OUTPATIENT
Start: 2024-09-09

## 2024-09-09 RX ORDER — BISACODYL 5 MG/1
TABLET, DELAYED RELEASE ORAL
Qty: 4 TABLET | Refills: 0 | Status: SHIPPED | OUTPATIENT
Start: 2024-09-09

## 2024-09-09 NOTE — TELEPHONE ENCOUNTER
Low Volume Golytely Bowel Prep  recommended due to provider request/ordered. Instructions were sent via letter. Bowel prep was sent 9/9/2024 to    WRITTEN PRESCRIPTION REQUESTED  [id number] - [place], [place] - [address]

## 2024-09-09 NOTE — TELEPHONE ENCOUNTER
Endoscopy Scheduling Screen  Preferred Pharmacy:  [id number] - [place], [place] - [address]  [address] [place] [address]  Phone: [phone] Fax: [phone]      Final Scheduling Details   Mobile: [phone]  Procedure scheduled  Colonoscopy / Upper endoscopy (EGD)    Surgeon:  GELY     Date of procedure:  09/30/2024     Pre-OP / PAC:   No - Not required for this site. - PER [facility]    Location  [facility] - Per order.    Sedation   MAC/Deep Sedation - Per order.      Patient Reminders:   You will receive a call from a Nurse to review instructions and health history.  This assessment must be completed prior to your procedure.  Failure to complete the Nurse assessment may result in the procedure being cancelled.      On the day of your procedure, please designate an adult(s) who can drive you home stay with you for the next 24 hours. The medicines used in the exam will make you sleepy. You will not be able to drive.      You cannot take public transportation, ride share services, or non-medical taxi service without a responsible caregiver.  Medical transport services are allowed with the requirement that a responsible caregiver will receive you at your destination.  We require that drivers and caregivers are confirmed prior to your procedure.

## 2024-09-16 ENCOUNTER — PATIENT OUTREACH (OUTPATIENT)
Dept: GASTROENTEROLOGY | Facility: CLINIC | Age: 84
End: 2024-09-16
Payer: COMMERCIAL

## 2024-09-16 NOTE — PROGRESS NOTES
Case discussed with RN CC and SLP - plan is to expedite EGD with empiric dilation of UES for diagnostics and therapeutic evaluation of dysphagia.     Pinky - can you contact the patient to ensure he's up to date on this plan?     Spoke with Alvin regarding the plan and he verbalized understanding   No further questions at this time   Discussed he will receive a call about a week before the procedure to review the instructions

## 2024-09-16 NOTE — PROGRESS NOTES
Pre-procedure note:     85 yo M who was referred for elective EGD (with possible dilation) & colonoscopy for evaluation of abdominal pain, dysphagia and weight loss. Barium swallow on 7/30/24 showed no evidence of esophageal stricture (patient was unable to swallow barium tablet). MBS on 9/6/24 revealed evidence of oropharyngeal dysphagia with episodes of laryngeal penetration and diffuse pharyngeal residue, in addition to prominent cricopharyngeal bar causing temporary stasis of barium tablet.     Ref: Bebe Montgomery PA-C

## 2024-09-19 ENCOUNTER — TELEPHONE (OUTPATIENT)
Dept: GASTROENTEROLOGY | Facility: CLINIC | Age: 84
End: 2024-09-19
Payer: COMMERCIAL

## 2024-09-19 NOTE — TELEPHONE ENCOUNTER
Pre visit planning completed.      Procedure details:    Patient scheduled for Colonoscopy/Upper endoscopy (EGD) on 9/30/24.     Arrival time: 1400. Procedure time 1500    Facility location: [facility]; [address] [place] [address], 5[address] Floor, [place] [address]. Check in location: 5th Floor.    Sedation type: MAC    Pre op exam needed? No.    Indication for procedure: weight loss, dysphagia, abdominal pain, constipation      Chart review:     Electronic implanted devices? No    Recent diagnosis of diverticulitis within the last 6 weeks? No      Medication review:    Diabetic? No    Anticoagulants? No    Weight loss medication/injectable? No GLP-1 medication per patient's medication list.  RN will verify with pre-assessment call.    Other medication HOLDING recommendations:  N/A      Prep for procedure:     Bowel prep recommendation: Low Volume Extended Golytely. Bowel prep prescription sent to    [id number] - [place], [place] - [address]   Due to: provider request/ordered.    Prep instructions sent via letter, sent by CRC BARBIE Mendoza, RN  Endoscopy Procedure Pre Assessment RN  [phone] option 2

## 2024-09-19 NOTE — TELEPHONE ENCOUNTER
Pre assessment completed for upcoming procedure.   (Please see previous telephone encounter notes for complete details)    Procedure details:    Arrival time and facility location reviewed.    Pre op exam needed? No.    Designated  policy reviewed. Instructed to have someone stay 24  hours post procedure.       Medication review:    Medications reviewed. Please see supporting documentation below. Holding recommendations discussed (if applicable).       Prep for procedure:     Procedure prep instructions reviewed. Patient stated they received letter. Writer went through instructions with patient in great detail with patient following along with letter. Patient to call back endoscopy team should they need any further assistance.       Any additional information needed:  N/A      Patient  verbalized understanding and had no questions or concerns at this time.      Mabel Major RN  Endoscopy Procedure Pre Assessment   [phone] option 2

## 2024-09-24 NOTE — TELEPHONE ENCOUNTER
Patient called and wanted to review his prep schedule - patient reviewed and this writer reassured patient that he had it all straight and was doing instructions correctly.    No further questions or concerns.     Mabel Lord RN  Endoscopy Procedure Pre Assessment RN  [phone] option 2

## 2024-09-27 ENCOUNTER — ANESTHESIA EVENT (OUTPATIENT)
Dept: SURGERY | Facility: AMBULATORY SURGERY CENTER | Age: 84
End: 2024-09-27
Payer: COMMERCIAL

## 2024-09-30 ENCOUNTER — ANESTHESIA (OUTPATIENT)
Dept: SURGERY | Facility: AMBULATORY SURGERY CENTER | Age: 84
End: 2024-09-30
Payer: COMMERCIAL

## 2024-09-30 ENCOUNTER — HOSPITAL ENCOUNTER (OUTPATIENT)
Facility: AMBULATORY SURGERY CENTER | Age: 84
Discharge: HOME | End: 2024-09-30
Attending: INTERNAL MEDICINE | Admitting: INTERNAL MEDICINE
Payer: COMMERCIAL

## 2024-09-30 VITALS
DIASTOLIC BLOOD PRESSURE: 83 MMHG | HEART RATE: 88 BPM | BODY MASS INDEX: 18.69 KG/M2 | OXYGEN SATURATION: 97 % | HEIGHT: 73 IN | TEMPERATURE: 96.8 F | SYSTOLIC BLOOD PRESSURE: 128 MMHG | RESPIRATION RATE: 16 BRPM | WEIGHT: 141 LBS

## 2024-09-30 VITALS — HEART RATE: 56 BPM

## 2024-09-30 LAB
COLONOSCOPY: NORMAL
UPPER GI ENDOSCOPY: NORMAL

## 2024-09-30 PROCEDURE — 99100 ANES PT EXTEME AGE<1 YR&>70: CPT | Performed by: ANESTHESIOLOGY

## 2024-09-30 PROCEDURE — 99100 ANES PT EXTEME AGE<1 YR&>70: CPT | Performed by: NURSE ANESTHETIST, CERTIFIED REGISTERED

## 2024-09-30 PROCEDURE — 45385 COLONOSCOPY W/LESION REMOVAL: CPT | Performed by: INTERNAL MEDICINE

## 2024-09-30 PROCEDURE — 43248 EGD GUIDE WIRE INSERTION: CPT | Performed by: INTERNAL MEDICINE

## 2024-09-30 PROCEDURE — 88305 TISSUE EXAM BY PATHOLOGIST: CPT | Mod: 26 | Performed by: PATHOLOGY

## 2024-09-30 PROCEDURE — 88305 TISSUE EXAM BY PATHOLOGIST: CPT | Mod: TC | Performed by: INTERNAL MEDICINE

## 2024-09-30 PROCEDURE — 43248 EGD GUIDE WIRE INSERTION: CPT | Performed by: ANESTHESIOLOGY

## 2024-09-30 PROCEDURE — 43239 EGD BIOPSY SINGLE/MULTIPLE: CPT | Mod: 59 | Performed by: INTERNAL MEDICINE

## 2024-09-30 PROCEDURE — 45380 COLONOSCOPY AND BIOPSY: CPT | Mod: 59 | Performed by: INTERNAL MEDICINE

## 2024-09-30 PROCEDURE — 43248 EGD GUIDE WIRE INSERTION: CPT | Performed by: NURSE ANESTHETIST, CERTIFIED REGISTERED

## 2024-09-30 RX ORDER — LIDOCAINE 40 MG/G
CREAM TOPICAL
Status: DISCONTINUED | OUTPATIENT
Start: 2024-09-30 | End: 2024-10-01 | Stop reason: HOSPADM

## 2024-09-30 RX ORDER — ONDANSETRON 2 MG/ML
4 INJECTION INTRAMUSCULAR; INTRAVENOUS
Status: DISCONTINUED | OUTPATIENT
Start: 2024-09-30 | End: 2024-10-01 | Stop reason: HOSPADM

## 2024-09-30 RX ORDER — PROPOFOL 10 MG/ML
INJECTION, EMULSION INTRAVENOUS CONTINUOUS PRN
Status: DISCONTINUED | OUTPATIENT
Start: 2024-09-30 | End: 2024-09-30

## 2024-09-30 RX ORDER — GLYCOPYRROLATE 0.2 MG/ML
INJECTION, SOLUTION INTRAMUSCULAR; INTRAVENOUS PRN
Status: DISCONTINUED | OUTPATIENT
Start: 2024-09-30 | End: 2024-09-30

## 2024-09-30 RX ORDER — SODIUM CHLORIDE, SODIUM LACTATE, POTASSIUM CHLORIDE, CALCIUM CHLORIDE 600; 310; 30; 20 MG/100ML; MG/100ML; MG/100ML; MG/100ML
INJECTION, SOLUTION INTRAVENOUS CONTINUOUS PRN
Status: DISCONTINUED | OUTPATIENT
Start: 2024-09-30 | End: 2024-09-30

## 2024-09-30 RX ORDER — PROPOFOL 10 MG/ML
INJECTION, EMULSION INTRAVENOUS PRN
Status: DISCONTINUED | OUTPATIENT
Start: 2024-09-30 | End: 2024-09-30

## 2024-09-30 RX ADMIN — PROPOFOL 30 MG: 10 INJECTION, EMULSION INTRAVENOUS at 15:05

## 2024-09-30 RX ADMIN — SODIUM CHLORIDE, SODIUM LACTATE, POTASSIUM CHLORIDE, CALCIUM CHLORIDE: 600; 310; 30; 20 INJECTION, SOLUTION INTRAVENOUS at 15:02

## 2024-09-30 RX ADMIN — PROPOFOL 150 MCG/KG/MIN: 10 INJECTION, EMULSION INTRAVENOUS at 15:05

## 2024-09-30 RX ADMIN — GLYCOPYRROLATE 0.2 MG: 0.2 INJECTION, SOLUTION INTRAMUSCULAR; INTRAVENOUS at 15:34

## 2024-09-30 RX ADMIN — Medication 100 MCG: at 15:46

## 2024-09-30 NOTE — ANESTHESIA POSTPROCEDURE EVALUATION
Patient: Alvin Prara    Procedure: Procedure(s):  Esophagoscopy, gastroscopy, duodenoscopy (EGD), combined with biopsy and savory dilation  Colonoscopy with polypectomy       Anesthesia Type:  MAC    Note:  Disposition: Outpatient   Postop Pain Control: Uneventful            Sign Out: Well controlled pain   PONV: No   Neuro/Psych: Uneventful            Sign Out: Acceptable/Baseline neuro status   Airway/Respiratory: Uneventful            Sign Out: Acceptable/Baseline resp. status   CV/Hemodynamics: Uneventful            Sign Out: Acceptable CV status; No obvious hypovolemia; No obvious fluid overload   Other NRE: NONE   DID A NON-ROUTINE EVENT OCCUR?            Last vitals:  Vitals Value Taken Time   BP 95/59 09/30/24 1620   Temp 36  C (96.8  F) 09/30/24 1613   Pulse     Resp 16 09/30/24 1620   SpO2 97 % 09/30/24 1620       Electronically Signed By: Ivan Corona MD  September 30, 2024  4:29 PM

## 2024-09-30 NOTE — ANESTHESIA CARE TRANSFER NOTE
Patient: Alvin Parra    Procedure: Procedure(s):  Esophagoscopy, gastroscopy, duodenoscopy (EGD), combined with biopsy and savory dilation  Colonoscopy with polypectomy       Diagnosis: Weight loss [R63.4]  Dysphagia, unspecified type [R13.10]  Abdominal pain, generalized [R10.84]  Constipation, unspecified constipation type [K59.00]  Diagnosis Additional Information: No value filed.    Anesthesia Type:   MAC     Note:    Oropharynx: oropharynx clear of all foreign objects and spontaneously breathing  Level of Consciousness: drowsy  Oxygen Supplementation: nasal cannula    Independent Airway: airway patency satisfactory and stable  Dentition: dentition unchanged  Vital Signs Stable: post-procedure vital signs reviewed and stable  Report to RN Given: handoff report given  Patient transferred to: Phase II    Handoff Report: Identifed the Patient, Identified the Reponsible Provider, Reviewed the pertinent medical history, Discussed the surgical course, Reviewed Intra-OP anesthesia mangement and issues during anesthesia, Set expectations for post-procedure period and Allowed opportunity for questions and acknowledgement of understanding      Vitals:  Vitals Value Taken Time   BP 94/62 09/30/24 1613   Temp 36  C (96.8  F) 09/30/24 1613   Pulse     Resp 16 09/30/24 1613   SpO2 99 % 09/30/24 1613       Electronically Signed By: SUGAR Nielsen CRNA  September 30, 2024  4:14 PM

## 2024-09-30 NOTE — H&P
"Gastroenterology Pre-op History and Physical    Alvin Parra MRN# [id number]   Age: 84 year old YOB: 1940      Date of Surgery: 09/30/24  [facility]      Date of Exam 9/30/2024 Facility Same Day       Primary care provider: Alexx Che         Chief Complaint and/or Reason for Procedure:     83 yo M who was referred for elective EGD (with possible dilation) & colonoscopy for evaluation of abdominal pain, dysphagia and weight loss. Barium swallow on 7/30/24 showed no evidence of esophageal stricture (patient was unable to swallow barium tablet). MBS on 9/6/24 revealed evidence of oropharyngeal dysphagia with episodes of laryngeal penetration and diffuse pharyngeal residue, in addition to prominent cricopharyngeal bar causing temporary stasis of barium tablet. He doesn't recall the last time he had colonoscopy and no reported family history of CRC.          Past Medical and Surgical History:     Past Medical History:   Diagnosis Date    H/O prostatectomy     Prostate cancer (H)      Past Surgical History:   Procedure Laterality Date    PROSTATECTOMY PERINEAL  2011            Medications (include herbals and vitamins):        (Not in a hospital admission)            Allergies:      Allergies   Allergen Reactions    Atorvastatin     Azithromycin     Fenofibrate     Penicillins     Simvastatin                Physical Exam:   All vitals have been reviewed  Patient Vitals for the past 8 hrs:   BP Temp Temp src Pulse Resp SpO2 Height Weight   09/30/24 1351 127/88 97  F (36.1  C) Temporal 88 16 94 % 1.854 m (6' 1\") 64 kg (141 lb)     No intake/output data recorded.    General: Lying in bed, in NAD  ENT: Normocephalic, atraumatic   Lungs: Normal work of breathing   Cardiovascular: Normal rate, regular rhythm   Abdomen: Soft, non-tender             Anesthetic risk and/or ASA classification:   ASA: 2    Alexander Prado MD       "

## 2024-09-30 NOTE — ANESTHESIA PREPROCEDURE EVALUATION
"Anesthesia Pre-Procedure Evaluation    Patient: Alvin Parra   MRN: [id number] : 1940        Procedure : Procedure(s):  Esophagoscopy, gastroscopy, duodenoscopy (EGD), combined  Colonoscopy          Past Medical History:   Diagnosis Date    H/O prostatectomy     Prostate cancer (H)       Past Surgical History:   Procedure Laterality Date    PROSTATECTOMY PERINEAL  2011      Allergies   Allergen Reactions    Atorvastatin     Azithromycin     Fenofibrate     Penicillins     Simvastatin       Social History     Tobacco Use    Smoking status: Never    Smokeless tobacco: Never   Substance Use Topics    Alcohol use: Yes     Comment: occasional      Wt Readings from Last 1 Encounters:   24 64 kg (141 lb)        Anesthesia Evaluation            ROS/MED HX  ENT/Pulmonary:       Neurologic:       Cardiovascular:     (+)  hypertension- -   -  - -                                      METS/Exercise Tolerance:     Hematologic:       Musculoskeletal:       GI/Hepatic:     (+) GERD,                   Renal/Genitourinary:       Endo:       Psychiatric/Substance Use:     (+) psychiatric history anxiety       Infectious Disease:       Malignancy:       Other:            Physical Exam    Airway        Mallampati: II       Respiratory Devices and Support         Dental       (+) Minor Abnormalities - some fillings, tiny chips      Cardiovascular          Rhythm and rate: regular     Pulmonary                   OUTSIDE LABS:  CBC: No results found for: \"WBC\", \"HGB\", \"HCT\", \"PLT\"  BMP: No results found for: \"NA\", \"POTASSIUM\", \"CHLORIDE\", \"CO2\", \"BUN\", \"CR\", \"GLC\"  COAGS: No results found for: \"PTT\", \"INR\", \"FIBR\"  POC: No results found for: \"BGM\", \"HCG\", \"HCGS\"  HEPATIC: No results found for: \"ALBUMIN\", \"PROTTOTAL\", \"ALT\", \"AST\", \"GGT\", \"ALKPHOS\", \"BILITOTAL\", \"BILIDIRECT\", \"KE\"  OTHER: No results found for: \"PH\", \"LACT\", \"A1C\", \"GABRIELE\", \"PHOS\", \"MAG\", \"LIPASE\", \"AMYLASE\", \"TSH\", \"T4\", \"T3\", \"CRP\", \"SED\"    Anesthesia " Plan    ASA Status:  2    NPO Status:  NPO Appropriate    Anesthesia Type: MAC.     - Reason for MAC: immobility needed              Consents    Anesthesia Plan(s) and associated risks, benefits, and realistic alternatives discussed. Questions answered and patient/representative(s) expressed understanding.     - Discussed:     - Discussed with:  Patient            Postoperative Care            Comments:               Ivan Corona MD    I have reviewed the pertinent notes and labs in the chart from the past 30 days and (re)examined the patient.  Any updates or changes from those notes are reflected in this note.

## 2024-09-30 NOTE — LETTER
October 7, 2024      Alvin Parra  [address]  [place] [address]        Dear Manjit Alvin Parra,    The polyps that were removed during your colonoscopy returned as benign. The biopsy results from the stomach and the esophagus were unremarkable. I recommend getting and upper endoscopy with ultrasound to evaluate for the stomach lesion noted on previous exam since the biopsies are unremarkable.     Resulted Orders   Surgical Pathology Exam   Result Value Ref Range    Case Report       Surgical Pathology Report                         Case: [id number]                                  Authorizing Provider:  Alexander Prado MD      Collected:           09/30/2024 03:24 PM          Ordering Location:     [facility] OR  Received:            10/01/2024 08:54 AM                                 [place]                                                                  Pathologist:           Pan Johnson MD                                                           Specimens:   A) - Other, Gastric Submucosa nodule biopsy                                                         B) - Other, Distal Esophagus Biopsy                                                                 C) - Other, Proximal Esophagus Biopsy                                                               D) - Large Intestine, Colon, Cecum polyp x 2                                                        E) - Large Intestine, Colon, Ascending colon polyp                                         Final Diagnosis       A. STOMACH, SUBMUCOSA NODULE, BIOPSY:  Gastric mucosa with no histologic abnormality; no submucosa compartment or nodule represented    B. DISTAL ESOPHAGUS, BIOPSY:   Squamous esophageal mucosa with no intraepithelial eosinophils or other abnormality     C. PROXIMAL ESOPHAGUS, BIOPSY:   Squamous esophageal mucosa with no intraepithelial eosinophils or other abnormality     D. CECUM, POLYPS X2,  "BIOPSY:  Fragments of tubular adenoma(s); negative for high-grade dysplasia    E. ASCENDING COLON, POLYP, BIOPSY:  Sessile serrated adenoma; no cytologic dysplasia        Clinical Information       Per chart, 84 year old male with a history of dysphagia and weight loss undergoing EGD and colonoscopy, with findings of a submucosal gastric antral nodule, two 1 - 2 mm cecal polyps, an 8 mm ascending colon polyp, and diverticulosis.      Gross Description       A(1). Other, Gastric Submucosa nodule biopsy:  The specimen is received in formalin with proper patient identification, labeled \"gastric submucosal nodule biopsy\".  The specimen consists of 4 irregular tan soft tissues up to 0.4 cm.  The specimen is submitted entirely in cassette A1.   B(2). Other, Distal Esophagus Biopsy:  The specimen is received in formalin with proper patient identification, labeled \"distal esophagus biopsy\".  The specimen consists of 2 irregular tan-white soft tissues up to 0.5 cm.  The specimen is wrapped, and submitted entirely in cassette B1.   C(3). Other, Proximal Esophagus Biopsy:  The specimen is received in formalin with proper patient identification, labeled \"proximal esophagus biopsy\".  The specimen consists of 2 irregular tan-white soft tissues up to 0.5 cm.  The specimen is wrapped, and submitted entirely in cassette C1.   D(4). Large Intestine, Colon, Cecum polyp x 2:  The specimen is received in formalin with proper patient identification, labeled \"cecum polyp x 2\".  The specimen consists of 2 irregular tan soft tissues up to 0.4 cm.  The specimen is submitted entirely in cassette D1.   E(5). Large Intestine, Colon, Ascending colon polyp:  The specimen is received in formalin with proper patient identification, labeled \"ascending colon polyp\".  The specimen consists of 3 irregular tan soft tissues up to 0.3 cm.  The specimen is submitted entirely in cassette E1.       Microscopic Description       Microscopic examination has " been performed.     Case was reviewed by the following:  Resident Pathologist: Isabelle Gifford MD  A resident or fellow in a training program was involved in the initial review, preparation, and/or interpretation of this case.  I, as the senior physician, attest that I have personally reviewed all specimens and or slides, including the listed special stains, and used them with my medical judgement to determine the final diagnosis.              Performing Labs       The technical component of this testing was completed at [facility].    Stain controls for all stains resulted within this report have been reviewed and show appropriate reactivity.       Case Images         If you have any questions or concerns, please call the clinic at the number listed above.       Sincerely,      Alexander Prado MD

## 2024-10-03 LAB
PATH REPORT.COMMENTS IMP SPEC: NORMAL
PATH REPORT.COMMENTS IMP SPEC: NORMAL
PATH REPORT.FINAL DX SPEC: NORMAL
PATH REPORT.GROSS SPEC: NORMAL
PATH REPORT.MICROSCOPIC SPEC OTHER STN: NORMAL
PATH REPORT.RELEVANT HX SPEC: NORMAL
PHOTO IMAGE: NORMAL

## 2024-10-08 ENCOUNTER — PATIENT OUTREACH (OUTPATIENT)
Dept: GASTROENTEROLOGY | Facility: CLINIC | Age: 84
End: 2024-10-08
Payer: COMMERCIAL

## 2024-10-08 NOTE — PROGRESS NOTES
Received a message from Bebe to give this patient a ca;l to schedule his follow up appointment to discussed his recent EGD and colonoscopy and next steps.     Spoke with Alvin and his wife, Beau.   Agreed to do a  virtual appointment on 11-7 at 10 am with Bebe WONG.   Beau asked for the link to be sent to her cell phone at [phone].     Message sent to the clinic coordinators to schedule

## 2024-11-07 ENCOUNTER — VIRTUAL VISIT (OUTPATIENT)
Dept: GASTROENTEROLOGY | Facility: CLINIC | Age: 84
End: 2024-11-07
Payer: COMMERCIAL

## 2024-11-07 DIAGNOSIS — R63.4 WEIGHT LOSS: ICD-10-CM

## 2024-11-07 DIAGNOSIS — R10.9 ABDOMINAL PAIN, UNSPECIFIED ABDOMINAL LOCATION: Primary | ICD-10-CM

## 2024-11-07 DIAGNOSIS — R13.12 OROPHARYNGEAL DYSPHAGIA: ICD-10-CM

## 2024-11-07 DIAGNOSIS — R85.9 SALIVA ABNORMAL: ICD-10-CM

## 2024-11-07 PROCEDURE — 99214 OFFICE O/P EST MOD 30 MIN: CPT | Mod: 95 | Performed by: PHYSICIAN ASSISTANT

## 2024-11-07 ASSESSMENT — PAIN SCALES - GENERAL: PAINLEVEL_OUTOF10: NO PAIN (0)

## 2024-11-07 NOTE — PROGRESS NOTES
"Virtual Visit Details    Type of service:  Video Visit     Originating Location (pt. Location): Home    Distant Location (provider location):  On-site  Platform used for Video Visit: Bulmaro    Joined the call at 11/7/2024, 9:59:24 am.  Left the call at 11/7/2024, 10:21:15 am.  You were on the call for 21 minutes 50 seconds         GI CLINIC VISIT    ASSESSMENT/PLAN:  Alvin Parra is a 84 year old year old functionally independent male with PMHx significant for prostate cancer s/p prostatectomy who presents w/ wife (who helps supply some collateral history) to est care with the  [facility] for dysphagia, anorexia, weight loss and constipation.     #Dysphagia x progressive since Jan 2023  Evidence of oropharyngeal dysphagia (at risk for aspiration) on VFSS 7/2023 and underwent therapy with SLP team. 9/6/24 VFSS with findings of laryngeal penetration w/o aspiration. Prominent cricopharyngeal bar present and evidence of diffuse pharyngeal residum with all consistencies seen. TBE 7/2024 with no evidence of barium column at 1 min though patient was unable to swallow tablet despite multiple attempts. EGD 9/30/24 - empiric esophageal dilation to 15 and 16 mm.     -significant improvement in dysphagia s/p EGD dilation  -referral to ENT for continued hypersalivation     Future consideration  HRM with 24 hour impedence testing for further dysmotility disorders   Repeat EGD w/ dilation as clinically indicated     #submucosal gastric lesion, antrum; EGD 9/2024  Bx - WNL gastric mucosa w/o submucosa compartment or nodule    Plan to review EGD with SP Dr Jorge A Thao. Likely to follow this with EUS, which I discussed with patient today.     #weight loss  #abd pain   #constipation   #stable upper abd mass on CTAP 5/2024- ? \"Stable hypodense mass within the anterior upper abdomen/omentum. Partially   calcified gastric duplication cyst? \"    5/2024 - external CT AP W contrast with stable appearing mass to upper " abdomen mass thought  gastric duplication cyst, per radiology. 9/2024 External CTC WO contrast with stable RUL pulm nodule with recommended follow up in 3 mo for low/high risk individuals . He follows these CTC findings with his primary. Now s/p bidirectional endoscopies w/ SSA and TA but without sinister findings. Ddx - malignancy, DGBI, gastroparesis vs other.     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 TSH and metabolic panel both WNL in past 6 mo. Consider celiac serology, sitz marker study, possible pelvic floor eval     Orders Placed This Encounter   Procedures    Adult ENT  Referral     RTC - dependent on above work up. I will review w/ him on MyChart.     Thank you for this consultation.  It was a pleasure to participate in the care of this patient; please contact me with any further questions.     Bebe Montgomery PA-C  Follow up: As planned above. Today, I personally spent 22 minutes in direct face to face time with the patient, of which greater than 50% of the time was spent in patient education and counseling as described above. Approximately 10 minutes were spent on indirect care associated with the patient's consultation including but not limited to review of: patient medical records to date, clinic visits, hospital records, lab results, imaging studies, procedural documentation, and coordinating care with other providers. The findings from this review are summarized in the above note. All of the above accounted for a cumulative time of 32 minutes and was performed on the date of service.     PUNEET Parra is a 84 year old year old male with PMHx of prostate cancer s/p prostatectomy following with the [facility] GI group for weight loss, dysphagia, constipation. Seen with me 7/2024, see below note. Today is follow-up.     7/25/24 appt with me   1. GI history and weight loss    Since aged 18  "reported GI troubles - sore stomach (mid abd), nausea, painful gas   -typically belches and burps and the pain goes away  -in past 12 mo, reports he lost 30# unintentionally, they feel this started after his COVID infection . Normal 180#, down to 153-155# now. Today at 153  -having abd pain that stops his meals , in addition to ongoing poor appetite   5/2024 - CTAP W contrast - w/ finding of a \"stable hypodense mass within the anterior upper abdomen/omentum. Partially   calcified gastric duplication cyst?\"  5/2024 - CTC WO contrast - b/l pulm nodules with recommendation for follow up CT in 3 mo for both low and high risk individuals   -brother and sister both with \"nervous stomachs\" but no known GI cancers     2. Dysphagia - new since summer 2023, progressive since then as well  -started as large pills then progressed to liquids and solids  -reports phlegm in his throat   -at times needs to spit up the swallowed food as he feels it getting stuck  -intermittent heartburn that is responsive to OTC gaviscon, no known triggers   -started drooling about 1 year ago, this occurs independent of eating   -reports ongoing fatigue  -7/10/23 had an abnl VFSS and underwent some therapy, see their eval below  Impression   Oral phase of swallow: Mild impairment but improved from last study  Pharyngeal phase of swallow: Moderate impairment but slightly improved cricopharyngeal function from last study resulting in fewer residuals in pyriform sinus. Epiglottic inversion remains reduced resulting in significant residuals in valleculae.  Suspected esophageal dysphagia: yes; history of reflux  Overall, the patient displayed some improvement in his swallowing function, however he did aspirate during this study x1 and remains at risk of aspiration due to pharyngeal residuals. This likely contributes to production of phlegm.    Recommendations   The following recommendations are based upon today's assessment and may reduce, but cannot " "entirely eliminate this patient's risk of aspiration: Diet textures: Thin Liquids and Level 6 Soft & Bite Sized (NDD3)     Plan of Care   No further Speech intervention is recommended at this time due to limited improvements from previous swallow study and continued risk of aspiration.     3. Long-standing constipation that he reports had improved some after he increased his water intake   -not currently on any bowel regiment  -typically passing a BM every other day, short soft segments after straining  -no blood or black stools  CTAP W Contrast 5/2024 -   IMPRESSION:   1.   Stool distended rectum. Moderate to large amount of stool within the   colon. Does the patient have a history of constipation? Additional bowel   findings described above.   -He does not recall his last Cscope       Today 11/7/24  Empiric esophageal dilation helped a lot - not having dysphagia like before. Able to drink a full glass of water w/o issues.   Still drooling for unclear reasons.   His other sx continue, however  He reports continued \"sore stomach\" sensation - usually to mid abdomen . When he eats, if pain gets very bad, he stops eating. In addition he endorses continued poor appetite  -pain feels like a pressure sensation   -continues with gas and bloating troubles - once he belches, the bloat and pain does resolve  -continues with nausea w/o emesis  Triggers - eating, within 5-10 min will have this abd pain   Alleviating - gaviscon helps , not as effective now  -typically passing a BM every other day, short soft segments after straining  -no blood or black stools   Still feels very tired and fatigued - continued sx. Wife shares he typically has a lot of energy.   Able to go about his ADLs/IADLs.     Family Hx  No other known family history or GI related malignancy (esophageal, gastric, pancreatic, liver or colon) or family history of IBD/celiac disease.     Social Hx     No  use of NSAIDs or Tylenol.     1-2 drinks every other night " of ETOH  No tobacco products.   No No recreational drug use.     Functionally independent  Works out 3x weekly  Volunteers at food shelter 2x weekly  Fatigue is affecting QoL - unable to carry out IALDs as he normally would do - needing more breaks in between     PROBLEM LIST  There are no active problems to display for this patient.      PERTINENT PAST MEDICAL HISTORY:  Past Medical History:   Diagnosis Date    H/O prostatectomy     Prostate cancer (H)        PREVIOUS SURGERIES:  Past Surgical History:   Procedure Laterality Date    COLONOSCOPY N/A 9/30/2024    Procedure: Colonoscopy with polypectomy;  Surgeon: Alexander Prado MD;  [place] [facility] [place]    ESOPHAGOSCOPY, GASTROSCOPY, DUODENOSCOPY (EGD), COMBINED N/A 9/30/2024    Procedure: Esophagoscopy, gastroscopy, duodenoscopy (EGD), combined with biopsy and savory dilation;  Surgeon: Alexander Prado MD;  [place] [facility] [place]    PROSTATECTOMY PERINEAL  2011       ALLERGIES:     Allergies   Allergen Reactions    Atorvastatin     Azithromycin     Fenofibrate     Penicillins     Simvastatin        PERTINENT MEDICATIONS:    Current Outpatient Medications:     bisacodyl (DULCOLAX) 5 MG EC tablet, One day prior to procedure at 3PM take two (2) tablets. If your procedure is BEFORE 11AM, take two (2) tablets at 11PM. If your procedure is AFTER 11AM, day of procedure take (2) tablets at 6AM., Disp: 4 tablet, Rfl: 0    IBUPROFEN PO, Take by mouth as needed for moderate pain., Disp: , Rfl:     pantoprazole (PROTONIX) 40 MG EC tablet, Take 40 mg by mouth 2 times daily, Disp: , Rfl:     polyethylene glycol (GOLYTELY) 236 g suspension, One day prior to procedure at 3 pm fill container with water. Cover and shake until mixed well. Drink an 8 oz. glass of mixture every 10-15 minutes until the 1st half of the jug is gone. Place the remainder of the Golytely in the refrigerator. At 8 pm, drink the 2nd half of the jug of Golytely bowel prep. Drink an 8 oz. glass of Golytely every  10-15 minutes until the container of Golytely is gone., Disp: 4000 mL, Rfl: 0    polyethylene glycol (MIRALAX) 17 GM/Dose powder, Take 1 capful (17g) of Miralax mixed with 8 oz of a clear liquid twice daily for 7 days prior to your scheduled procedure., Disp: 238 g, Rfl: 0    VITAMIN D, CHOLECALCIFEROL, PO, Take by mouth daily., Disp: , Rfl:     vitamin E (TOCOPHEROL) 1000 units (450 mg) CAPS capsule, Take 1,000 Units by mouth daily, Disp: , Rfl:     SOCIAL HISTORY:  Social History     Socioeconomic History    Marital status:      Spouse name: Not on file    Number of children: Not on file    Years of education: Not on file    Highest education level: Not on file   Occupational History    Not on file   Tobacco Use    Smoking status: Never    Smokeless tobacco: Never   Substance and Sexual Activity    Alcohol use: Yes     Comment: occasional    Drug use: No    Sexual activity: Not on file   Other Topics Concern    Not on file   Social History Narrative    Not on file     Social Drivers of Health     Financial Resource Strain: Not on file   Food Insecurity: Not on file   Transportation Needs: Not on file   Physical Activity: Sufficiently Active (2/18/2021)    [facility]    Exercise Vital Sign     Days of Exercise per Week: 3 days     Minutes of Exercise per Session: 90 min   Stress: Not on file   Social Connections: Not on file   Interpersonal Safety: Low Risk  (9/30/2024)    Interpersonal Safety     Do you feel physically and emotionally safe where you currently live?: Yes     Within the past 12 months, have you been hit, slapped, kicked or otherwise physically hurt by someone?: No     Within the past 12 months, have you been humiliated or emotionally abused in other ways by your partner or ex-partner?: No   Housing Stability: Not on file       FAMILY HISTORY:  Family History   Problem Relation Age of Onset    Family History Negative Unknown        Past/family/social history  "reviewed and no changes    PHYSICAL EXAMINATION:  Vitals reviewed: There were no vitals taken for this visit.  Wt:   Wt Readings from Last 2 Encounters:   09/30/24 64 kg (141 lb)   07/25/24 69.4 kg (153 lb)      Video physical exam  General: Patient appears well in no acute distress.   Skin: No visualized rash or lesions on visualized skin  Eyes: EOMI, no erythema, sclera icterus or discharge noted  Resp: Appears to be breathing comfortably without accessory muscle usage, speaking in full sentences, no cough  MSK: Appears to have normal range of motion based on visualized movements  Neurologic: No apparent tremors, facial movements symmetric  Psych: affect normal, alert and oriented. More engaged in appt when compared to last visit.     PERTINENT STUDIES:    No results found for any previous visit.        No results found for: \"WBC\", \"HGB\", \"PLT\", \"CHOL\", \"TRIG\", \"HDL\", \"LDLDIRECT\", \"ALT\", \"AST\", \"NA\", \"CREATININE\", \"BUN\", \"CO2\", \"TSH\", \"INR\", \"GLUF\"     Liver Function Studies - No results for input(s): \"PROTTOTAL\", \"ALBUMIN\", \"BILITOTAL\", \"ALKPHOS\", \"AST\", \"ALT\", \"BILIDIRECT\" in the last 81985 hours.     517/24  Liver: Unremarkable.   Gallbladder and bile ducts: The patient has had a cholecystectomy. There is a   mild, expected degree of intrahepatic bile duct and common duct dilation post   cholecystectomy which appears similar to 7/24/2023.   Pancreas: Unremarkable.   Spleen: Unremarkable spleen. An accessory splenule is present.   Adrenal glands: Unremarkable adrenal glands.   Kidneys and ureters: Right kidney: There is persistent dilatation of the   extrarenal pelvis which may be due to chronic ureteropelvic junction   obstruction. Otherwise unremarkable.The ureter is poorly seen. Left kidney:   Unremarkable. Visualized ureter appears unremarkable.   Stomach and bowel: There are several surgical clips at the gastroesophageal   junction.Borderline distended distal descending duodenum/proximal 3rd portion   of " the duodenum.Narrowing of the 3rd portion of the duodenum between the   abdominal aorta and the SMA/SMV without evidence for high-grade obstruction at   this level. There is enteric contrast within the small bowel. Rectum is   distended with stool measuring up to 7.8 cm. There is colonic diverticulosis.   Moderate to large amount of stool within the colon.   Appendix: Not visualized. Reportedly appendectomy.     Intraperitoneal space: Within the anterior abdomen/omentum there is a nodular   density with marginal calcification measuring 2.5 x 1.5 x 3 cm which appears   stable on axial image 51/4. This is contiguous with the anterior inferior   distal stomach also similar to the prior study.   Vasculature: Atherosclerotic changes to include the nonaneurysmal abdominal   aorta. Atherosclerotic and stenotic origin of the celiac axis.   Lymph nodes: Unremarkable.   Urinary bladder: Unremarkable.   Reproductive: Absent prostate gland. Several surgical clips within the pelvis.   Bones/joints: There are degenerative changes within the spine. Stable grade 1   L4-L5 spondylolisthesis. Stable mild wedging of L1. Spinal stenosis L4-L5.   Soft tissues: Small fatty bilateral inguinal hernias.     IMPRESSION:   1.   Stool distended rectum. Moderate to large amount of stool within the   colon. Does the patient have a history of constipation? Additional bowel   findings described above.   2.   Absent prostate.   3.   Stable hypodense mass within the anterior upper abdomen/omentum. Partially   calcified gastric duplication cyst?   4.   Right renal changes described above.   5.   Additional non-critical/less critical findings as described in the report.     ULTRASOUND-GUIDED PERCUTANEOUS ABDOMINAL MASS BIOPSY: 8/8/2023 10:05 CDT   INDICATION: ICD-10 R19.00 Abdominal mass, unspecified abdominal location   IMPRESSION: Successful ultrasound-guided abdominal wall mass percutaneous core biopsy without complication. The mass appeared to be  in the region of the patient's previous hernia repair scar and could reflect a region of fat necrosis. Biopsy results will dictate further management.       PREVIOUS ENDOSCOPY    Results for orders placed or performed during the hospital encounter of 09/30/24   COLONOSCOPY    Collection Time: 09/30/24 11:41 AM   Result Value Ref Range    COLONOSCOPY       [facility]  [address] [place], [place] [address]., [place] [address] [phone]     Endoscopy Department  _______________________________________________________________________________  Patient Name: Alvin Parra             Procedure Date: 9/30/2024 11:41 AM  MRN: [id number]                       Account Number: [id number]  YOB: 1940              Admit Type: Outpatient  Age: 84                               [place] [facility] [place]  Gender: Male                          Note Status: Finalized  Attending MD: JG HONG , ,    Pause for the Cause: Pause for cause was   completed  Total Sedation Time:                    _______________________________________________________________________________     Procedure:             Colonoscopy  Indications:           Weight loss  Providers:             JOB MCCLELLAND CRNA, Anayeli Burgos RN, Eleni Adler, Technician  Patient Profile:       85 yo M who was referred for electi ve EGD (with                          possible dilation) & colonoscopy for evaluation of                          abdominal pain, dysphagia and weight loss. Barium                          swallow on 7/30/24 showed no evidence of esophageal                          stricture (patient was unable to swallow barium                          tablet). MBS on 9/6/24 revealed evidence of                          oropharyngeal dysphagia with episodes of laryngeal                          penetration and diffuse pharyngeal residue, in                          addition to prominent  cricopharyngeal bar causing                          temporary stasis of barium tablet. He doesn't recall                          the last time he had colonoscopy and no reported                          family history of CRC.  Referring MD:          ERIBERTO SALGUERO  Medicines:             Monitored Anesthesia Care  Complications:         No immediate complications. Estimated blood loss: None.  ____________________________ ___________________________________________________  Procedure:             Pre-Anesthesia Assessment:                         - Prior to the procedure, a History and Physical was                          performed, and patient medications, allergies and                          sensitivities were reviewed. The patient's tolerance                          of previous anesthesia was reviewed.                         - ASA Grade Assessment: II - A patient with mild                          systemic disease.                         After obtaining informed consent, the colonoscope was                          passed under direct vision. Throughout the procedure,                          the patient's blood pressure, pulse, and oxygen                          saturations were monitored continuously. The                          Colonoscope was introduced through the anus and                          advanced to the cecum, identified by appendiceal                          orifice and ileoc ecal valve. The colonoscopy was                          performed without difficulty. The patient tolerated                          the procedure well. The quality of the bowel                          preparation was fair/adequate to identify polyps                          greater than 5 mm in size.                                                                                   Findings:       The perianal and digital rectal examinations were normal.       Two hyperplastic polyps were found in the cecum. The  polyps were 1 to 2        mm in size. These polyps were removed with a jumbo cold forceps.        Resection and retrieval were complete. The pathology specimen was placed        into Bottle Number 4.       An 8 mm polyp was found in the ascending colon. The polyp was flat and        hyperplastic. The polyp was removed with a cold snare. Resection and        retrieval were complete. The pathology specimen was placed into Bottle        Number 5.       No large polyp or mass see n within the limitation of bowel prep.       Multiple small and large-mouthed diverticula were found in the        recto-sigmoid colon.       Non-bleeding internal hemorrhoids were found during retroflexion. The        hemorrhoids were small.                                                                                   Impression:            - Two 1 to 2 mm polyps in the cecum, removed with a                          jumbo cold forceps. Resected and retrieved.                         - One 8 mm polyp in the ascending colon, removed with                          a cold snare. Resected and retrieved.                         - Diverticulosis in the recto-sigmoid colon.                         - Non-bleeding internal hemorrhoids.  Recommendation:        - Discharge patient to home (ambulatory).                         - Resume previous diet.                         - Await pathology results.                         - No further screening colonosocpies given age >80                          - The findings and recommendations were discussed with                          the patient.                         - Written discharge instructions were provided to the                          patient.                                                                                     __________________  JG HONG,   9/30/2024 4:25:01 PM  Number of Addenda: 0    Note Initiated On: 9/30/2024 11:41 AM  Scope In: 3:31:23 PM  Scope Out: 4:04:13 PM

## 2024-11-07 NOTE — NURSING NOTE
Current patient location: [address] [place] [address]  [facility] [id number]    Is the patient currently in the state of [place]? YES    Visit mode:VIDEO    If the visit is dropped, the patient can be reconnected by: VIDEO VISIT: Send to e-mail at: [email or web address]    Will anyone else be joining the visit? YES: How would they like to receive their invitation? Send to e-mail: NA  (If patient encounters technical issues they should call [phone]    Are changes needed to the allergy or medication list? Pt stated no changes to allergies and Pt stated no med changes    Are refills needed on medications prescribed by this physician? NO    Rooming Documentation:  Questionnaire(s) not pre-assigned    Reason for visit: RECHECK    Lacho LUCEROF

## 2024-11-07 NOTE — LETTER
11/7/2024      Alvin Parra  [address]  [place] [address]      Dear Colleague,    Thank you for referring your patient, Alvin Parra, to the [facility] [place]. Please see a copy of my visit note below.    Virtual Visit Details    Type of service:  Video Visit     Originating Location (pt. Location): Home    Distant Location (provider location):  On-site  Platform used for Video Visit: Bulmaro    Joined the call at 11/7/2024, 9:59:24 am.  Left the call at 11/7/2024, 10:21:15 am.  You were on the call for 21 minutes 50 seconds         GI CLINIC VISIT    ASSESSMENT/PLAN:  Alvin Parra is a 84 year old year old functionally independent male with PMHx significant for prostate cancer s/p prostatectomy who presents w/ wife (who helps supply some collateral history) to est care with the  [facility] for dysphagia, anorexia, weight loss and constipation.     #Dysphagia x progressive since Jan 2023  Evidence of oropharyngeal dysphagia (at risk for aspiration) on VFSS 7/2023 and underwent therapy with SLP team. 9/6/24 VFSS with findings of laryngeal penetration w/o aspiration. Prominent cricopharyngeal bar present and evidence of diffuse pharyngeal residum with all consistencies seen. TBE 7/2024 with no evidence of barium column at 1 min though patient was unable to swallow tablet despite multiple attempts. EGD 9/30/24 - empiric esophageal dilation to 15 and 16 mm.     -significant improvement in dysphagia s/p EGD dilation  -referral to ENT for continued hypersalivation     Future consideration  HRM with 24 hour impedence testing for further dysmotility disorders   Repeat EGD w/ dilation as clinically indicated     #submucosal gastric lesion, antrum; EGD 9/2024  Bx - WNL gastric mucosa w/o submucosa compartment or nodule    Plan to review EGD with SP Dr Jorge A Thao. Likely to follow this with EUS, which I discussed with patient today.     #weight loss  #abd  "pain   #constipation   #stable upper abd mass on CTAP 5/2024- ? \"Stable hypodense mass within the anterior upper abdomen/omentum. Partially   calcified gastric duplication cyst? \"    5/2024 - external CT AP W contrast with stable appearing mass to upper abdomen mass thought  gastric duplication cyst, per radiology. 9/2024 External CTC WO contrast with stable RUL pulm nodule with recommended follow up in 3 mo for low/high risk individuals . He follows these CTC findings with his primary. Now s/p bidirectional endoscopies w/ SSA and TA but without sinister findings. Ddx - malignancy, DGBI, gastroparesis vs other.     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 TSH and metabolic panel both WNL in past 6 mo. Consider celiac serology, sitz marker study, possible pelvic floor eval     Orders Placed This Encounter   Procedures     Adult ENT  Referral     RTC - dependent on above work up. I will review w/ him on MyChart.     Thank you for this consultation.  It was a pleasure to participate in the care of this patient; please contact me with any further questions.     Bebe Montgomery PA-C  Follow up: As planned above. Today, I personally spent 22 minutes in direct face to face time with the patient, of which greater than 50% of the time was spent in patient education and counseling as described above. Approximately 10 minutes were spent on indirect care associated with the patient's consultation including but not limited to review of: patient medical records to date, clinic visits, hospital records, lab results, imaging studies, procedural documentation, and coordinating care with other providers. The findings from this review are summarized in the above note. All of the above accounted for a cumulative time of 32 minutes and was performed on the date of service.     HPI  Alvin Parra is a 84 year old year old male " "with PMHx of prostate cancer s/p prostatectomy following with the [facility] group for weight loss, dysphagia, constipation. Seen with me 7/2024, see below note. Today is follow-up.     7/25/24 appt with me   1. GI history and weight loss    Since aged 18 reported GI troubles - sore stomach (mid abd), nausea, painful gas   -typically belches and burps and the pain goes away  -in past 12 mo, reports he lost 30# unintentionally, they feel this started after his COVID infection . Normal 180#, down to 153-155# now. Today at 153  -having abd pain that stops his meals , in addition to ongoing poor appetite   5/2024 - CTAP W contrast - w/ finding of a \"stable hypodense mass within the anterior upper abdomen/omentum. Partially   calcified gastric duplication cyst?\"  5/2024 - CTC WO contrast - b/l pulm nodules with recommendation for follow up CT in 3 mo for both low and high risk individuals   -brother and sister both with \"nervous stomachs\" but no known GI cancers     2. Dysphagia - new since summer 2023, progressive since then as well  -started as large pills then progressed to liquids and solids  -reports phlegm in his throat   -at times needs to spit up the swallowed food as he feels it getting stuck  -intermittent heartburn that is responsive to OTC gaviscon, no known triggers   -started drooling about 1 year ago, this occurs independent of eating   -reports ongoing fatigue  -7/10/23 had an abnl VFSS and underwent some therapy, see their eval below  Impression   Oral phase of swallow: Mild impairment but improved from last study  Pharyngeal phase of swallow: Moderate impairment but slightly improved cricopharyngeal function from last study resulting in fewer residuals in pyriform sinus. Epiglottic inversion remains reduced resulting in significant residuals in valleculae.  Suspected esophageal dysphagia: yes; history of reflux  Overall, the patient displayed some improvement in his swallowing function, however he did " "aspirate during this study x1 and remains at risk of aspiration due to pharyngeal residuals. This likely contributes to production of phlegm.    Recommendations   The following recommendations are based upon today's assessment and may reduce, but cannot entirely eliminate this patient's risk of aspiration: Diet textures: Thin Liquids and Level 6 Soft & Bite Sized (NDD3)     Plan of Care   No further Speech intervention is recommended at this time due to limited improvements from previous swallow study and continued risk of aspiration.     3. Long-standing constipation that he reports had improved some after he increased his water intake   -not currently on any bowel regiment  -typically passing a BM every other day, short soft segments after straining  -no blood or black stools  CTAP W Contrast 5/2024 -   IMPRESSION:   1.   Stool distended rectum. Moderate to large amount of stool within the   colon. Does the patient have a history of constipation? Additional bowel   findings described above.   -He does not recall his last Cscope       Today 11/7/24  Empiric esophageal dilation helped a lot - not having dysphagia like before. Able to drink a full glass of water w/o issues.   Still drooling for unclear reasons.   His other sx continue, however  He reports continued \"sore stomach\" sensation - usually to mid abdomen . When he eats, if pain gets very bad, he stops eating. In addition he endorses continued poor appetite  -pain feels like a pressure sensation   -continues with gas and bloating troubles - once he belches, the bloat and pain does resolve  -continues with nausea w/o emesis  Triggers - eating, within 5-10 min will have this abd pain   Alleviating - gaviscon helps , not as effective now  -typically passing a BM every other day, short soft segments after straining  -no blood or black stools   Still feels very tired and fatigued - continued sx. Wife shares he typically has a lot of energy.   Able to go about his " ADLs/IADLs.     Family Hx  No other known family history or GI related malignancy (esophageal, gastric, pancreatic, liver or colon) or family history of IBD/celiac disease.     Social Hx     No  use of NSAIDs or Tylenol.     1-2 drinks every other night of ETOH  No tobacco products.   No No recreational drug use.     Functionally independent  Works out 3x weekly  Volunteers at food shelter 2x weekly  Fatigue is affecting QoL - unable to carry out IALDs as he normally would do - needing more breaks in between     PROBLEM LIST  There are no active problems to display for this patient.      PERTINENT PAST MEDICAL HISTORY:  Past Medical History:   Diagnosis Date     H/O prostatectomy      Prostate cancer (H)        PREVIOUS SURGERIES:  Past Surgical History:   Procedure Laterality Date     COLONOSCOPY N/A 9/30/2024    Procedure: Colonoscopy with polypectomy;  Surgeon: Alexander Prado MD;  Location: [facility] [place]     ESOPHAGOSCOPY, GASTROSCOPY, DUODENOSCOPY (EGD), COMBINED N/A 9/30/2024    Procedure: Esophagoscopy, gastroscopy, duodenoscopy (EGD), combined with biopsy and savory dilation;  Surgeon: Alexander Prado MD;  Location: [facility] [place]     PROSTATECTOMY PERINEAL  2011       ALLERGIES:     Allergies   Allergen Reactions     Atorvastatin      Azithromycin      Fenofibrate      Penicillins      Simvastatin        PERTINENT MEDICATIONS:    Current Outpatient Medications:      bisacodyl (DULCOLAX) 5 MG EC tablet, One day prior to procedure at 3PM take two (2) tablets. If your procedure is BEFORE 11AM, take two (2) tablets at 11PM. If your procedure is AFTER 11AM, day of procedure take (2) tablets at 6AM., Disp: 4 tablet, Rfl: 0     IBUPROFEN PO, Take by mouth as needed for moderate pain., Disp: , Rfl:      pantoprazole (PROTONIX) 40 MG EC tablet, Take 40 mg by mouth 2 times daily, Disp: , Rfl:      polyethylene glycol (GOLYTELY) 236 g suspension, One day prior to procedure at 3 pm fill container with water. Cover and shake  until mixed well. Drink an 8 oz. glass of mixture every 10-15 minutes until the 1st half of the jug is gone. Place the remainder of the Golytely in the refrigerator. At 8 pm, drink the 2nd half of the jug of Golytely bowel prep. Drink an 8 oz. glass of Golytely every 10-15 minutes until the container of Golytely is gone., Disp: 4000 mL, Rfl: 0     polyethylene glycol (MIRALAX) 17 GM/Dose powder, Take 1 capful (17g) of Miralax mixed with 8 oz of a clear liquid twice daily for 7 days prior to your scheduled procedure., Disp: 238 g, Rfl: 0     VITAMIN D, CHOLECALCIFEROL, PO, Take by mouth daily., Disp: , Rfl:      vitamin E (TOCOPHEROL) 1000 units (450 mg) CAPS capsule, Take 1,000 Units by mouth daily, Disp: , Rfl:     SOCIAL HISTORY:  Social History     Socioeconomic History     Marital status:      Spouse name: Not on file     Number of children: Not on file     Years of education: Not on file     Highest education level: Not on file   Occupational History     Not on file   Tobacco Use     Smoking status: Never     Smokeless tobacco: Never   Substance and Sexual Activity     Alcohol use: Yes     Comment: occasional     Drug use: No     Sexual activity: Not on file   Other Topics Concern     Not on file   Social History Narrative     Not on file     Social Drivers of Health     Financial Resource Strain: Not on file   Food Insecurity: Not on file   Transportation Needs: Not on file   Physical Activity: Sufficiently Active (2/18/2021)    Received from [facility] and [facility]    Exercise Vital Sign      Days of Exercise per Week: 3 days      Minutes of Exercise per Session: 90 min   Stress: Not on file   Social Connections: Not on file   Interpersonal Safety: Low Risk  (9/30/2024)    Interpersonal Safety      Do you feel physically and emotionally safe where you currently live?: Yes      Within the past 12 months, have you been hit, slapped, kicked or otherwise physically hurt by someone?: No      Within  "the past 12 months, have you been humiliated or emotionally abused in other ways by your partner or ex-partner?: No   Housing Stability: Not on file       FAMILY HISTORY:  Family History   Problem Relation Age of Onset     Family History Negative Unknown        Past/family/social history reviewed and no changes    PHYSICAL EXAMINATION:  Vitals reviewed: There were no vitals taken for this visit.  Wt:   Wt Readings from Last 2 Encounters:   09/30/24 64 kg (141 lb)   07/25/24 69.4 kg (153 lb)      Video physical exam  General: Patient appears well in no acute distress.   Skin: No visualized rash or lesions on visualized skin  Eyes: EOMI, no erythema, sclera icterus or discharge noted  Resp: Appears to be breathing comfortably without accessory muscle usage, speaking in full sentences, no cough  MSK: Appears to have normal range of motion based on visualized movements  Neurologic: No apparent tremors, facial movements symmetric  Psych: affect normal, alert and oriented. More engaged in appt when compared to last visit.     PERTINENT STUDIES:    No results found for any previous visit.        No results found for: \"WBC\", \"HGB\", \"PLT\", \"CHOL\", \"TRIG\", \"HDL\", \"LDLDIRECT\", \"ALT\", \"AST\", \"NA\", \"CREATININE\", \"BUN\", \"CO2\", \"TSH\", \"INR\", \"GLUF\"     Liver Function Studies - No results for input(s): \"PROTTOTAL\", \"ALBUMIN\", \"BILITOTAL\", \"ALKPHOS\", \"AST\", \"ALT\", \"BILIDIRECT\" in the last 20140 hours.     517/24  Liver: Unremarkable.   Gallbladder and bile ducts: The patient has had a cholecystectomy. There is a   mild, expected degree of intrahepatic bile duct and common duct dilation post   cholecystectomy which appears similar to 7/24/2023.   Pancreas: Unremarkable.   Spleen: Unremarkable spleen. An accessory splenule is present.   Adrenal glands: Unremarkable adrenal glands.   Kidneys and ureters: Right kidney: There is persistent dilatation of the   extrarenal pelvis which may be due to chronic ureteropelvic junction "   obstruction. Otherwise unremarkable.The ureter is poorly seen. Left kidney:   Unremarkable. Visualized ureter appears unremarkable.   Stomach and bowel: There are several surgical clips at the gastroesophageal   junction.Borderline distended distal descending duodenum/proximal 3rd portion   of the duodenum.Narrowing of the 3rd portion of the duodenum between the   abdominal aorta and the SMA/SMV without evidence for high-grade obstruction at   this level. There is enteric contrast within the small bowel. Rectum is   distended with stool measuring up to 7.8 cm. There is colonic diverticulosis.   Moderate to large amount of stool within the colon.   Appendix: Not visualized. Reportedly appendectomy.     Intraperitoneal space: Within the anterior abdomen/omentum there is a nodular   density with marginal calcification measuring 2.5 x 1.5 x 3 cm which appears   stable on axial image 51/4. This is contiguous with the anterior inferior   distal stomach also similar to the prior study.   Vasculature: Atherosclerotic changes to include the nonaneurysmal abdominal   aorta. Atherosclerotic and stenotic origin of the celiac axis.   Lymph nodes: Unremarkable.   Urinary bladder: Unremarkable.   Reproductive: Absent prostate gland. Several surgical clips within the pelvis.   Bones/joints: There are degenerative changes within the spine. Stable grade 1   L4-L5 spondylolisthesis. Stable mild wedging of L1. Spinal stenosis L4-L5.   Soft tissues: Small fatty bilateral inguinal hernias.     IMPRESSION:   1.   Stool distended rectum. Moderate to large amount of stool within the   colon. Does the patient have a history of constipation? Additional bowel   findings described above.   2.   Absent prostate.   3.   Stable hypodense mass within the anterior upper abdomen/omentum. Partially   calcified gastric duplication cyst?   4.   Right renal changes described above.   5.   Additional non-critical/less critical findings as described in  the report.     ULTRASOUND-GUIDED PERCUTANEOUS ABDOMINAL MASS BIOPSY: 8/8/2023 10:05 CDT   INDICATION: ICD-10 R19.00 Abdominal mass, unspecified abdominal location   IMPRESSION: Successful ultrasound-guided abdominal wall mass percutaneous core biopsy without complication. The mass appeared to be in the region of the patient's previous hernia repair scar and could reflect a region of fat necrosis. Biopsy results will dictate further management.       PREVIOUS ENDOSCOPY    Results for orders placed or performed during the hospital encounter of 09/30/24   COLONOSCOPY    Collection Time: 09/30/24 11:41 AM   Result Value Ref Range    COLONOSCOPY       [facility]  [address] [place], [place] [address]., [place] [address] [phone]     Endoscopy Department  _______________________________________________________________________________  Patient Name: Alvin Parra             Procedure Date: 9/30/2024 11:41 AM  MRN: [id number]                       Account Number: [id number]  YOB: 1940              Admit Type: Outpatient  Age: 84                               [place] [facility] [place] [facility]  Gender: Male                          Note Status: Finalized  Attending MD: JG HONG , ,    Pause for the Cause: Pause for cause was   completed  Total Sedation Time:                    _______________________________________________________________________________     Procedure:             Colonoscopy  Indications:           Weight loss  Providers:             JOB MCCLELLAND CRNA, Anayeli Burgos RN, Eleni Adler, Technician  Patient Profile:       85 yo M who was referred for electi ve EGD (with                          possible dilation) & colonoscopy for evaluation of                          abdominal pain, dysphagia and weight loss. Barium                          swallow on 7/30/24 showed no evidence of esophageal                          stricture (patient was  unable to swallow barium                          tablet). MBS on 9/6/24 revealed evidence of                          oropharyngeal dysphagia with episodes of laryngeal                          penetration and diffuse pharyngeal residue, in                          addition to prominent cricopharyngeal bar causing                          temporary stasis of barium tablet. He doesn't recall                          the last time he had colonoscopy and no reported                          family history of CRC.  Referring MD:          ERIBERTO SALGUERO  Medicines:             Monitored Anesthesia Care  Complications:         No immediate complications. Estimated blood loss: None.  ____________________________ ___________________________________________________  Procedure:             Pre-Anesthesia Assessment:                         - Prior to the procedure, a History and Physical was                          performed, and patient medications, allergies and                          sensitivities were reviewed. The patient's tolerance                          of previous anesthesia was reviewed.                         - ASA Grade Assessment: II - A patient with mild                          systemic disease.                         After obtaining informed consent, the colonoscope was                          passed under direct vision. Throughout the procedure,                          the patient's blood pressure, pulse, and oxygen                          saturations were monitored continuously. The                          Colonoscope was introduced through the anus and                          advanced to the cecum, identified by appendiceal                          orifice and ileoc ecal valve. The colonoscopy was                          performed without difficulty. The patient tolerated                          the procedure well. The quality of the bowel                          preparation was fair/adequate to  identify polyps                          greater than 5 mm in size.                                                                                   Findings:       The perianal and digital rectal examinations were normal.       Two hyperplastic polyps were found in the cecum. The polyps were 1 to 2        mm in size. These polyps were removed with a jumbo cold forceps.        Resection and retrieval were complete. The pathology specimen was placed        into Bottle Number 4.       An 8 mm polyp was found in the ascending colon. The polyp was flat and        hyperplastic. The polyp was removed with a cold snare. Resection and        retrieval were complete. The pathology specimen was placed into Bottle        Number 5.       No large polyp or mass see n within the limitation of bowel prep.       Multiple small and large-mouthed diverticula were found in the        recto-sigmoid colon.       Non-bleeding internal hemorrhoids were found during retroflexion. The        hemorrhoids were small.                                                                                   Impression:            - Two 1 to 2 mm polyps in the cecum, removed with a                          jumbo cold forceps. Resected and retrieved.                         - One 8 mm polyp in the ascending colon, removed with                          a cold snare. Resected and retrieved.                         - Diverticulosis in the recto-sigmoid colon.                         - Non-bleeding internal hemorrhoids.  Recommendation:        - Discharge patient to home (ambulatory).                         - Resume previous diet.                         - Await pathology results.                         - No further screening colonosocpies given age >80                          - The findings and recommendations were discussed with                          the patient.                         - Written discharge instructions were provided to the                           patient.                                                                                     __________________  JG HOGN,   9/30/2024 4:25:01 PM  Number of Addenda: 0    Note Initiated On: 9/30/2024 11:41 AM  Scope In: 3:31:23 PM  Scope Out: 4:04:13 PM       Again, thank you for allowing me to participate in the care of your patient.        Sincerely,        Bebe Montgomery PA-C

## 2024-11-08 NOTE — TELEPHONE ENCOUNTER
FUTURE VISIT INFORMATION      FUTURE VISIT INFORMATION:  Date: 1/23/25  Time: 11:15 AM  Location: [facility] [place] - ENT  REFERRAL INFORMATION:  Referring provider:  Eriberto Salguero PA-C  Referring providers clinic: [facility] GASTROENTEROLOGY    Reason for visit/diagnosis:  R85.9 (ICD-10-CM) - Saliva abnormal  R63.4 (ICD-10-CM) - Weight loss  R13.12 (ICD-10-CM) - Oropharyngeal dysphagia   POSSIBLE ZENKERS  REF BY ERIBERTO SALGUERO  RECS IN [facility]  CONF LOC  SCHED W/PT  PER JUSTICE ON SECURE CHAT- Please schedule with Dr. Aparicio on 1/23 at 11:15, also if you could put possible Zenker's in the appt note that would be great. Thanks!     RECORDS REQUESTED FROM      Clinic name Comments Records Status Imaging Status   [facility] GASTRO[facility]OLOGY  11/6/24 referral/ note- Eriberto Salguero PA-C  [facility] Imaging 9/6/24 XR video swallow  7/30/24 XR esophagram Epic PACS   [facility] Procedure 9/30/2 Upper GI Endoscopy [facility]  Fax: [phone]  MRN: [id number]  9/10/24 CT chest  5/17/24 CT chest - PACS  7/10/23, 5/16/23 XR video swallow -- PACS    Track:  CE Pending req

## 2024-12-29 ENCOUNTER — HEALTH MAINTENANCE LETTER (OUTPATIENT)
Age: 84
End: 2024-12-29

## 2024-12-30 ENCOUNTER — TELEPHONE (OUTPATIENT)
Dept: GASTROENTEROLOGY | Facility: CLINIC | Age: 84
End: 2024-12-30
Payer: COMMERCIAL

## 2024-12-30 DIAGNOSIS — K31.89 SUBMUCOSAL LESION OF STOMACH: Primary | ICD-10-CM

## 2024-12-30 NOTE — TELEPHONE ENCOUNTER
"Following up on appt with me     Case discussed with Dr Jorge A Thao     Submucosal nodule - Follow EGD with EUS with push enteroscopy for further eval of submucosal nodule and complete duodenal evaluation. This was discussed with patient at our last appt. Tried patient again today, no answer. Left VM. Will follow with MyChart.     Weight loss, seen recently with primary. No further loss. 2# gain. Now on mirtazapine     Anterior abdominal mass seen on CTAPs - appears stable on rads read (7/2024) when compared to 5/2023 CTAP study. \"This is contiguous with the anterior inferior distal stomach also similar to the prior study.\" He had an abdominal mass biopsy on 8/8/2023 US guided needle biopsy showing hyalinized fibrosis with calcifications, fibrin debris and hemorrhage. Per IM note 9/2023, this was thought consistent with scar tissue from prior surgery. Can consider further evaluation with MR abdomen, if warranted.     Dysphagia - improved after EGD with esophageal dilation   Stooling - on miralax but still with large colonic stool buren seen on CT. Would also like to start secretogogue, if pt willing, and consider pelvic floor evaluation     Tried patient to review the above, no answer. Left VM. Will follow with Mychart.     SY  "

## 2024-12-30 NOTE — TELEPHONE ENCOUNTER
Spoke with patient.     Overall stable   Dysphagia - sx coming back but overall better than before EGD with dilation. Daily sensation. Today he tells me he had a procedure on his esophagus 20 years ago at [facility] ([facility] of [place] States it was done because of his dysphagia. After the procedure, reports the dysphagia resolved. On recent EGD 9/2024, esophagus noted to be normal. Will try to locate records. It also appears his dysphagia may be longer standing than previously reported. Consider high resolution manometry with pH testing for dysmotility evaluation.   CTAP with surgical clips - I am not sure where these clips came from. Tried inquiring about abdominal surgeries - reports CCY, appendectomy, L lower hernia repair. Today he denies surgery on stomach though this was listed on CTAP as one of the clinical indications. Recent EGD without comment on other findings (other than submucosal nodule) seen to stomach.   CTAP anterior abdominal mass - confirms that this was evaluated with the US guided Bx in 2023 and that the overall conclusion was that it was scar tissue.   Weight loss - leveled off 151-152 . Still with poor appetite. Today reports abd pain does not flare after eating (reported previously). On mirtazapine now. No falls but feels groggy since start of this Rx. Asked he reach out to care team on this sx and med use for further discussion on dose/med use in general.  Stooling - going every other day, soft movements. Does have ongoing rectal spasms that can be painful, intermittent. Ongoing sx for many years. CTAP with distended rectum full of stools measuring 7.8 cm. He is actually not on a daily bowel regiment. I asked he start daily miralax 0.5 cap daily. Reviewed precautions to decrease or stop med. Cont hydration.   If needed, he is OK to get open MRI. No implants,stimulators,defibrillators. Can get some mild claustrophobia.     Plan  -EUS, indication reviewed -  scheduling line given to patient verbally   -start of daily miralax  -follow up with primary regarding mirtazapine   -follow up with me after EUS, I asked he schedule appt     SY

## 2024-12-31 ENCOUNTER — TELEPHONE (OUTPATIENT)
Dept: GASTROENTEROLOGY | Facility: CLINIC | Age: 84
End: 2024-12-31
Payer: COMMERCIAL

## 2024-12-31 NOTE — TELEPHONE ENCOUNTER
Advanced Endoscopy     Referring provider:  Bebe Montgomery PA-C     Referred to: [facility]     Provider Requested:  none specified     Referral Received:  12/30/24     Records received:  EPIC    EGD 9/30/24  Impression:            - Z-line regular, 45 cm from the incisors.                          - Normal esophagus. Dilated.                          - Submucosal gastric lesion in the gastric antrum.                          Biopsied.                          - Normal examined duodenum.                          - Biopsies were taken with a cold forceps for                          evaluation of eosinophilic esophagitis.      Final Diagnosis   A. STOMACH, SUBMUCOSA NODULE, BIOPSY:  Gastric mucosa with no histologic abnormality; no submucosa compartment or nodule represented     B. DISTAL ESOPHAGUS, BIOPSY:   Squamous esophageal mucosa with no intraepithelial eosinophils or other abnormality      C. PROXIMAL ESOPHAGUS, BIOPSY:   Squamous esophageal mucosa with no intraepithelial eosinophils or other abnormality      D. CECUM, POLYPS X2, BIOPSY:  Fragments of tubular adenoma(s); negative for high-grade dysplasia     E. ASCENDING COLON, POLYP, BIOPSY:  Sessile serrated adenoma; no cytologic dysplasia     CT abd/pelvis with contrast 5/15/24    IMPRESSION:   1.   Stool distended rectum. Moderate to large amount of stool within the   colon. Does the patient have a history of constipation? Additional bowel   findings described above.   2.   Absent prostate.   3.   Stable hypodense mass within the anterior upper abdomen/omentum. Partially   calcified gastric duplication cyst?   4.   Right renal changes described above.   5.   Additional non-critical/less critical findings as described in the report.   Images received:      Insurance Coverage:  [facility]    Evaluation for:  EUS for Submucosal lesion of stomach     84 yM with chronic abd pain, weight loss. recent EGD with gastric antral submucosal nodule. Bx  "non-diagnostic. Proceeding with EUS with push enteroscopy for futher eval. CTAP with narrowing to D3 and borderline distended descending duodenum      Clinical History (per RN review):    Virtual visit with referring provider 11/7/24     ASSESSMENT/PLAN:  Alvin Parra is a 84 year old year old functionally independent male with PMHx significant for prostate cancer s/p prostatectomy who presents w/ wife (who helps supply some collateral history) to est care with the  [facility] GI team for dysphagia, anorexia, weight loss and constipation.      #Dysphagia x progressive since Jan 2023  Evidence of oropharyngeal dysphagia (at risk for aspiration) on VFSS 7/2023 and underwent therapy with SLP team. 9/6/24 VFSS with findings of laryngeal penetration w/o aspiration. Prominent cricopharyngeal bar present and evidence of diffuse pharyngeal residum with all consistencies seen. TBE 7/2024 with no evidence of barium column at 1 min though patient was unable to swallow tablet despite multiple attempts. EGD 9/30/24 - empiric esophageal dilation to 15 and 16 mm.      -significant improvement in dysphagia s/p EGD dilation  -referral to ENT for continued hypersalivation      Future consideration  HRM with 24 hour impedence testing for further dysmotility disorders   Repeat EGD w/ dilation as clinically indicated      #submucosal gastric lesion, antrum; EGD 9/2024  Bx - WNL gastric mucosa w/o submucosa compartment or nodule     Plan to review EGD with SP Dr Jorge A Thao. Likely to follow this with EUS, which I discussed with patient today.      #weight loss  #abd pain   #constipation   #stable upper abd mass on CTAP 5/2024- ? \"Stable hypodense mass within the anterior upper abdomen/omentum. Partially   calcified gastric duplication cyst? 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 9/2024 External CTC WO contrast with stable RUL " pulm nodule with recommended follow up in 3 mo for low/high risk individuals . He follows these CTC findings with his primary. Now s/p bidirectional endoscopies w/ SSA and TA but without sinister findings. Ddx - malignancy, DGBI, gastroparesis vs other.      -likely EUS, per above      Future consideration  Considering eval of stable appearing upper abd mass, ?MR abdomen  Further eval of constipation. TSH and metabolic panel both WNL in past 6 mo. Consider celiac serology, sitz marker study, possible pelvic floor eval     Provider review date:   Provider Decision for clinic consultation/Orders:            Referral updates/Patient contacted:

## 2025-01-08 ENCOUNTER — TELEPHONE (OUTPATIENT)
Dept: GASTROENTEROLOGY | Facility: CLINIC | Age: 85
End: 2025-01-08
Payer: COMMERCIAL

## 2025-01-08 NOTE — TELEPHONE ENCOUNTER
"Endoscopy Scheduling Screen    Have you had any respiratory illness or flu-like symptoms in the last 10 days?  No    What is your communication preference for Instructions and/or Bowel Prep?   Mail/USPS    What insurance is in the chart?  Other:  [facility] MEDICARE    Ordering/Referring Provider:  ERIBERTO SALGUERO   (If ordering provider performs procedure, schedule with ordering provider unless otherwise instructed. )    BMI: Estimated body mass index is 18.6 kg/m  as calculated from the following:    Height as of 9/30/24: 1.854 m (6' 1\").    Weight as of 9/30/24: 64 kg (141 lb).     Sedation Ordered  MAC/deep sedation.   BMI<= 45 45 < BMI <= 48 48 < BMI < = 50  BMI > 50   No Restrictions No [facility]  No [facility]  [facility] with exceptions Hospital Only OR Only       Do you have a history of malignant hyperthermia?  No    Have you been diagnosed or told you have pulmonary hypertension?   No    Do you have an LVAD?  No    Have you been told you have moderate to severe sleep apnea?  No.    Have you been told you have COPD, asthma, or any other lung disease?  No    Do you have any heart conditions?  No     Have you ever had or are you waiting for an organ transplant?  No. Continue scheduling, no site restrictions.    Have you had a stroke or transient ischemic attack (TIA aka \"mini stroke\" in the last 6 months?   No    Have you been diagnosed with or been told you have cirrhosis of the liver?   No.    Are you currently on dialysis?   No    Do you need assistance transferring?   No    BMI: Estimated body mass index is 18.6 kg/m  as calculated from the following:    Height as of 9/30/24: 1.854 m (6' 1\").    Weight as of 9/30/24: 64 kg (141 lb).     Is patients BMI > 40 and scheduling location [facility]?  No    Do you take an injectable or oral medication for weight loss or diabetes (excluding insulin)?  No    Do you take the medication Naltrexone?  No    Do you take blood thinners?  No       Prep   Are you currently on dialysis or do you " have chronic kidney disease?  No    Do you have a diagnosis of diabetes?  No    Do you have a diagnosis of cystic fibrosis (CF)?  No    On a regular basis do you go 3 -5 days between bowel movements?  No    BMI > 40?  No    Preferred Pharmacy:    [id number] - [place], [place] - [address]  [address] [place] [address]  Phone: [phone] Fax: [phone]      Final Scheduling Details     Procedure scheduled  Endoscopic ultrasound (EUS)    Surgeon:  TED     Date of procedure:  1/21/2025     Pre-OP / PAC:   No - Not required for this site.    Location  [facility] - Per order.    Sedation   MAC/Deep Sedation - Per order.      Patient Reminders:   You will receive a call from a Nurse to review instructions and health history.  This assessment must be completed prior to your procedure.  Failure to complete the Nurse assessment may result in the procedure being cancelled.      On the day of your procedure, please designate an adult(s) who can drive you home stay with you for the next 24 hours. The medicines used in the exam will make you sleepy. You will not be able to drive.      You cannot take public transportation, ride share services, or non-medical taxi service without a responsible caregiver.  Medical transport services are allowed with the requirement that a responsible caregiver will receive you at your destination.  We require that drivers and caregivers are confirmed prior to your procedure.

## 2025-01-09 ENCOUNTER — TELEPHONE (OUTPATIENT)
Dept: GASTROENTEROLOGY | Facility: CLINIC | Age: 85
End: 2025-01-09
Payer: COMMERCIAL

## 2025-01-09 NOTE — TELEPHONE ENCOUNTER
Add on EUS    Pre assessment completed for upcoming procedure.      Procedure details:    Patient scheduled for Endoscopic ultrasound (EUS) on 1.21.25.     Arrival time: 1130. Procedure time 1300    Facility location: [facility]; [address] [place] [address], [address] Floor, [place] [address]. Check in location: Main entrance at registration desk.    Sedation type: MAC    Pre op exam needed? No.    Indication for procedure: 84 yM with chronic abd pain, weight loss. recent EGD with gastric antral submucosal nodule. Bx non-diagnostic. Proceeding with EUS with push enteroscopy for futher eval. CTAP with narrowing to D3 and borderline distended descending duodenum.     Designated  policy reviewed. Instructed to have someone stay 24 hours post procedure.       Chart review:     Electronic implanted devices? No    Recent diagnosis of diverticulitis within the last 6 weeks?  N/A      Medication review:    Diabetic? No    Anticoagulants? No    Weight loss medication/injectable? No.    Other medication HOLDING recommendations:  N/A      Prep for procedure:       Prep instructions sent via letter     Reviewed procedure prep instructions.     Patient verbalized understanding and had no questions or concerns at this time.        Cristel Goodwin RN  Endoscopy Procedure Pre Assessment   [phone] option 2

## 2025-01-16 ENCOUNTER — PATIENT OUTREACH (OUTPATIENT)
Dept: GASTROENTEROLOGY | Facility: CLINIC | Age: 85
End: 2025-01-16
Payer: COMMERCIAL

## 2025-01-16 NOTE — PROGRESS NOTES
Received a call from Alvin to review his upcoming appointments.   1-21 EUS and discussed the location   1-23 ENT and discussed location    No further questions

## 2025-01-21 ENCOUNTER — HOSPITAL ENCOUNTER (OUTPATIENT)
Facility: CLINIC | Age: 85
Discharge: HOME | End: 2025-01-21
Attending: INTERNAL MEDICINE | Admitting: INTERNAL MEDICINE
Payer: COMMERCIAL

## 2025-01-21 ENCOUNTER — ANESTHESIA (OUTPATIENT)
Dept: GASTROENTEROLOGY | Facility: CLINIC | Age: 85
End: 2025-01-21
Payer: COMMERCIAL

## 2025-01-21 ENCOUNTER — ANESTHESIA EVENT (OUTPATIENT)
Dept: GASTROENTEROLOGY | Facility: CLINIC | Age: 85
End: 2025-01-21
Payer: COMMERCIAL

## 2025-01-21 VITALS
OXYGEN SATURATION: 100 % | DIASTOLIC BLOOD PRESSURE: 72 MMHG | SYSTOLIC BLOOD PRESSURE: 127 MMHG | RESPIRATION RATE: 16 BRPM

## 2025-01-21 PROCEDURE — 88305 TISSUE EXAM BY PATHOLOGIST: CPT | Mod: TC | Performed by: INTERNAL MEDICINE

## 2025-01-21 PROCEDURE — 88305 TISSUE EXAM BY PATHOLOGIST: CPT | Mod: 26 | Performed by: PATHOLOGY

## 2025-01-21 PROCEDURE — 88173 CYTOPATH EVAL FNA REPORT: CPT | Mod: TC | Performed by: INTERNAL MEDICINE

## 2025-01-21 PROCEDURE — 88173 CYTOPATH EVAL FNA REPORT: CPT | Mod: 26 | Performed by: PATHOLOGY

## 2025-01-21 PROCEDURE — 370N000017 HC ANESTHESIA TECHNICAL FEE, PER MIN: Performed by: INTERNAL MEDICINE

## 2025-01-21 PROCEDURE — 258N000003 HC RX IP 258 OP 636: Performed by: NURSE ANESTHETIST, CERTIFIED REGISTERED

## 2025-01-21 PROCEDURE — 250N000009 HC RX 250: Performed by: NURSE ANESTHETIST, CERTIFIED REGISTERED

## 2025-01-21 PROCEDURE — 44360 SMALL BOWEL ENDOSCOPY: CPT | Performed by: INTERNAL MEDICINE

## 2025-01-21 PROCEDURE — 43242 EGD US FINE NEEDLE BX/ASPIR: CPT | Performed by: INTERNAL MEDICINE

## 2025-01-21 PROCEDURE — 250N000011 HC RX IP 250 OP 636: Performed by: NURSE ANESTHETIST, CERTIFIED REGISTERED

## 2025-01-21 RX ORDER — ONDANSETRON 4 MG/1
4 TABLET, ORALLY DISINTEGRATING ORAL EVERY 30 MIN PRN
Status: DISCONTINUED | OUTPATIENT
Start: 2025-01-21 | End: 2025-01-21 | Stop reason: HOSPADM

## 2025-01-21 RX ORDER — LIDOCAINE HYDROCHLORIDE 20 MG/ML
INJECTION, SOLUTION INFILTRATION; PERINEURAL PRN
Status: DISCONTINUED | OUTPATIENT
Start: 2025-01-21 | End: 2025-01-21

## 2025-01-21 RX ORDER — PROPOFOL 10 MG/ML
INJECTION, EMULSION INTRAVENOUS PRN
Status: DISCONTINUED | OUTPATIENT
Start: 2025-01-21 | End: 2025-01-21

## 2025-01-21 RX ORDER — ONDANSETRON 2 MG/ML
4 INJECTION INTRAMUSCULAR; INTRAVENOUS EVERY 6 HOURS PRN
Status: DISCONTINUED | OUTPATIENT
Start: 2025-01-21 | End: 2025-01-21 | Stop reason: HOSPADM

## 2025-01-21 RX ORDER — NALOXONE HYDROCHLORIDE 0.4 MG/ML
0.1 INJECTION, SOLUTION INTRAMUSCULAR; INTRAVENOUS; SUBCUTANEOUS
Status: DISCONTINUED | OUTPATIENT
Start: 2025-01-21 | End: 2025-01-21 | Stop reason: HOSPADM

## 2025-01-21 RX ORDER — ONDANSETRON 2 MG/ML
4 INJECTION INTRAMUSCULAR; INTRAVENOUS EVERY 30 MIN PRN
Status: DISCONTINUED | OUTPATIENT
Start: 2025-01-21 | End: 2025-01-21 | Stop reason: HOSPADM

## 2025-01-21 RX ORDER — NALOXONE HYDROCHLORIDE 0.4 MG/ML
0.2 INJECTION, SOLUTION INTRAMUSCULAR; INTRAVENOUS; SUBCUTANEOUS
Status: DISCONTINUED | OUTPATIENT
Start: 2025-01-21 | End: 2025-01-21 | Stop reason: HOSPADM

## 2025-01-21 RX ORDER — PROPOFOL 10 MG/ML
INJECTION, EMULSION INTRAVENOUS CONTINUOUS PRN
Status: DISCONTINUED | OUTPATIENT
Start: 2025-01-21 | End: 2025-01-21

## 2025-01-21 RX ORDER — SODIUM CHLORIDE, SODIUM LACTATE, POTASSIUM CHLORIDE, CALCIUM CHLORIDE 600; 310; 30; 20 MG/100ML; MG/100ML; MG/100ML; MG/100ML
INJECTION, SOLUTION INTRAVENOUS CONTINUOUS PRN
Status: DISCONTINUED | OUTPATIENT
Start: 2025-01-21 | End: 2025-01-21

## 2025-01-21 RX ORDER — ACETAMINOPHEN 325 MG/1
975 TABLET ORAL ONCE
Status: DISCONTINUED | OUTPATIENT
Start: 2025-01-21 | End: 2025-01-21 | Stop reason: HOSPADM

## 2025-01-21 RX ORDER — NALOXONE HYDROCHLORIDE 0.4 MG/ML
0.4 INJECTION, SOLUTION INTRAMUSCULAR; INTRAVENOUS; SUBCUTANEOUS
Status: DISCONTINUED | OUTPATIENT
Start: 2025-01-21 | End: 2025-01-21 | Stop reason: HOSPADM

## 2025-01-21 RX ORDER — FLUMAZENIL 0.1 MG/ML
0.2 INJECTION, SOLUTION INTRAVENOUS
Status: DISCONTINUED | OUTPATIENT
Start: 2025-01-21 | End: 2025-01-21 | Stop reason: HOSPADM

## 2025-01-21 RX ORDER — ONDANSETRON 4 MG/1
4 TABLET, ORALLY DISINTEGRATING ORAL EVERY 6 HOURS PRN
Status: DISCONTINUED | OUTPATIENT
Start: 2025-01-21 | End: 2025-01-21 | Stop reason: HOSPADM

## 2025-01-21 RX ORDER — SODIUM CHLORIDE, SODIUM LACTATE, POTASSIUM CHLORIDE, CALCIUM CHLORIDE 600; 310; 30; 20 MG/100ML; MG/100ML; MG/100ML; MG/100ML
INJECTION, SOLUTION INTRAVENOUS CONTINUOUS
Status: DISCONTINUED | OUTPATIENT
Start: 2025-01-21 | End: 2025-01-21 | Stop reason: HOSPADM

## 2025-01-21 RX ORDER — LIDOCAINE 40 MG/G
CREAM TOPICAL
Status: DISCONTINUED | OUTPATIENT
Start: 2025-01-21 | End: 2025-01-21 | Stop reason: HOSPADM

## 2025-01-21 RX ORDER — PROCHLORPERAZINE MALEATE 5 MG/1
5 TABLET ORAL EVERY 6 HOURS PRN
Status: DISCONTINUED | OUTPATIENT
Start: 2025-01-21 | End: 2025-01-21 | Stop reason: HOSPADM

## 2025-01-21 RX ADMIN — PROPOFOL 150 MCG/KG/MIN: 10 INJECTION, EMULSION INTRAVENOUS at 12:14

## 2025-01-21 RX ADMIN — PROPOFOL 50 MG: 10 INJECTION, EMULSION INTRAVENOUS at 12:14

## 2025-01-21 RX ADMIN — PHENYLEPHRINE HYDROCHLORIDE 100 MCG: 10 INJECTION INTRAVENOUS at 13:04

## 2025-01-21 RX ADMIN — PHENYLEPHRINE HYDROCHLORIDE 100 MCG: 10 INJECTION INTRAVENOUS at 12:51

## 2025-01-21 RX ADMIN — PHENYLEPHRINE HYDROCHLORIDE 100 MCG: 10 INJECTION INTRAVENOUS at 12:45

## 2025-01-21 RX ADMIN — SODIUM CHLORIDE, POTASSIUM CHLORIDE, SODIUM LACTATE AND CALCIUM CHLORIDE: 600; 310; 30; 20 INJECTION, SOLUTION INTRAVENOUS at 12:10

## 2025-01-21 RX ADMIN — PHENYLEPHRINE HYDROCHLORIDE 100 MCG: 10 INJECTION INTRAVENOUS at 13:15

## 2025-01-21 RX ADMIN — PHENYLEPHRINE HYDROCHLORIDE 100 MCG: 10 INJECTION INTRAVENOUS at 13:30

## 2025-01-21 RX ADMIN — LIDOCAINE HYDROCHLORIDE 100 MG: 20 INJECTION, SOLUTION INFILTRATION; PERINEURAL at 12:14

## 2025-01-21 ASSESSMENT — ACTIVITIES OF DAILY LIVING (ADL)
ADLS_ACUITY_SCORE: 41

## 2025-01-21 ASSESSMENT — ENCOUNTER SYMPTOMS: DYSRHYTHMIAS: 0

## 2025-01-21 NOTE — ANESTHESIA CARE TRANSFER NOTE
Patient: Alvin Parra    Procedure: Procedure(s):  ESOPHAGOGASTRODUODENOSCOPY, WITH FINE NEEDLE ASPIRATION BIOPSY, WITH ENDOSCOPIC ULTRASOUND GUIDANCE  Enteroscopy small bowel       Diagnosis: Submucosal lesion of stomach [K31.89]  Diagnosis Additional Information: No value filed.    Anesthesia Type:   MAC     Note:    Oropharynx: oropharynx clear of all foreign objects and spontaneously breathing  Level of Consciousness: drowsy  Oxygen Supplementation: nasal cannula  Level of Supplemental Oxygen (L/min / FiO2): 3  Independent Airway: airway patency satisfactory and stable  Dentition: dentition unchanged  Vital Signs Stable: post-procedure vital signs reviewed and stable  Report to RN Given: handoff report given  Patient transferred to: Phase II    Handoff Report: Identifed the Patient, Identified the Reponsible Provider, Reviewed the pertinent medical history, Discussed the surgical course, Reviewed Intra-OP anesthesia mangement and issues during anesthesia, Set expectations for post-procedure period and Allowed opportunity for questions and acknowledgement of understanding      Vitals:  Vitals Value Taken Time   /56 01/21/25 1331   Temp 36    Pulse 50    Resp 10    SpO2 99 % 01/21/25 1331       Electronically Signed By: SUGAR Soni CRNA  January 21, 2025  1:32 PM

## 2025-01-21 NOTE — LETTER
[facility] ENDOSCOPY  [address] [place] [address]  [phone]          January 9, 2025    Alvin Parra                                                                                                                     [address]  [place] [address]            Dear Alvin,      Endoscopic ultrasound (EUS)     Procedure date: 1.21.25    Anticipated arrival time: 1130 AM   (Please note that arrival times may change)    Facility location: [facility]; [address], [place] [address] - Check in location: Main entrance at registration desk. Parking information: Self pay parking available in the Patient & Visitor Ramp on the corner of  [address].  options are available 24 hours for patients with mobility limitations.     Important Procedure Reminders:     Prep Instructions:   Instructions on how to prepare for your upcoming procedure are found below. Please read instructions carefully. Deviation from instructions may result in less than desired outcomes and procedure may need to be rescheduled.   If you have additional questions regarding how to prepare for your upcoming procedure, contact our endoscopy pre assessment nurses at [phone] option 2 Monday through Friday 7:00am-5:00pm.      Policy:   The medications used during the procedure will make you sleepy, so you won't be able to drive. On the day of your procedure, please have an adult ready to drive you home and stay with you for the next 24 hours.   You can't use public transportation, ride-share services, or non-medical taxi services without a responsible caregiver. Medical transport services are okay, but a caregiver must be there to receive you at your destination.   Make sure your  and caregiver are confirmed before your procedure.    Day of procedure:  Please keep in mind that arrival times may change. Let your  know there might be a one-hour window for  changes.  We ask that you please check in at the  with your . Your  should remain on campus.  Expect to be at the procedure center for about 1.5-2.5 hours.    Please do not wear jewelry (i.e. earrings, rings, necklaces, watches, etc). Leave your purse, billfold, credit cards, and other valuables at home.   Bring insurance card and ID.     To cancel or reschedule your procedure:   If you need to cancel or reschedule, our endoscopy scheduling team can be reached at [phone], option 2. Monday through Friday, 7:00am-5:00pm.    Medication Reminders:    Please note the following medication holding recommendations:   N/A    ---------------------------------------------------------------------------------------------------------------------------------------------------------------------------------------------------------------    Endoscopic Ultrasound  Pre-Assessment Phone Number: [facility] - [phone] option 2        Before your procedure, a nurse will call you to go over instructions and your health history.  It's important to complete the nurse assessment call before your procedure.  Failure to do so may result in your procedure being cancelled.    On the day of your procedure, please have an adult ready to drive you home and stay with you for the next 24 hours. The medications used during the procedure will make you sleepy, so you won't be able to drive.  You can't use public transportation, ride-share services, or non-medical taxi services without a responsible caregiver. Medical transport services are okay, but a caregiver must be there to receive you at your destination. Make sure your  and caregiver are confirmed before your procedure.    Your procedure arrival time could change, so please keep that in mind and let your  know there might be a one-hour window for changes. When you arrive, check in at the  with your . Your   should stay on campus. Plan to be at the procedure center for about 1.5-2.5 hours.          What is an Endoscopic Ultrasound (EUS)?  EUS is an ultrasound examination of the upper part of your gastro-intestinal (GI) tract and surrounding organs.  This includes the esophagus (food tube), stomach, duodenum (the first part of the bowel), the liver, pancreas, gallbladder, spleen and lymph nodes in this area.      Important: You must complete all steps before the exam.    Getting ready   - Review the instructions the day before your exam.   - Check with your insurance company to be sure they will cover this exam.  - If you are having sedation, you must arrange for an adult to drive you home. This adult must also stay with you for 24 hours (unless your provider tells you otherwise).  - If you have advanced kidney disease or are on dialysis: please call the endoscopy center. We may need to change how you get ready for this exam.    Seven days before the exam   - Talk to your doctor: If you take blood-thinners (such as Coumadin, Plavix, Xarelto), your prescription or schedule may need to change before the test.  - Continue taking prescribed aspirin; talk to your prescribing doctor with any concerns.    - If you have diabetes: Ask to have your exam early in the morning. Also, ask your doctor if you should change your diet or medicines    One day before the exam   - Stop eating all solid foods 8 hours prior to arrival time. You may drink clear liquids.   ** If you have achalasia (treated or untreated) or gastroparesis, please be on a clear liquid diet for 24 hours prior to your exam and stop drinking all liquids at midnight.   - Stop taking sucralfate medication.    On the day of your exam  - Your stomach must be empty during the study. If your procedure is scheduled late in the day, be sure to drink a lot of fluids the day prior to EUS.   - You may drink clear liquids until 2 hours before your arrival time.  - Do not smoke or  swallow anything, including water or gum for at least 2 hours before your arrival time. This is a safety issue. Your procedure could be cancelled if you do not follow directions.  - No chewing tobacco 6 hours prior to procedure arrival time.   - Please do not wear jewelry (i.e. earrings, rings, necklaces, watches, etc) . Leave your purse, billfold, credit cards, and other valuables at home.   - You may take all of your morning medicines (except for diabetes pills) as usual with 4 oz. of water up to 2 hours before your arrival time.    What are clear liquids?   You may have:  - Water, tea, coffee (no cream)  - Soda pop, Gatorade (not red or purple)  - Clear nutrition drinks (Enlive, Resource Breeze)   - Jell-O, Popsicles (no milk or fruit pieces) or sorbet (not red or purple)  - Fat-free soup broth or bouillon  - Plain hard cand, such as clear life savers (not red or purple)  - Clear juices and fruit-flavored drinks such as apple juice, white grape juice, Hi-C and Joel-Aid (not red or purple)   Do not have:  - Milk or milk products such as ice cream, malts or shakes  - Red or purple drinks of any kind such as cranberry juice or grape juice. Avoid red or purple Jell-O, Popsicles, Joel-Aid, sorbet and candy  - Juices with pulp such as orange, grapefruit, pineapple or tomato juice  - Cream soups of any kind  - Alcohol       What to expect  - The doctor will explain the exam and you will be asked to sign a consent form.  - The doctor gently inserts the scope into your mouth.   - This exam does not take long. If you request sedation, you will not be able to drive for 24 hours.   - The doctor may take a small piece of tissue for study in the lab. This is called a biopsy and it will not hurt.    After the exam  - Your doctor will discuss test results with you. If you have any questions, ask your doctor or nurse.  - You may have a sore throat, nausea, and belly pain or cramping. This is normal.   - NOTE: If you are sedated  for this exam, you will need an adult  to take you home and stay with you for 24 hours, unless otherwise stated by your provider.    Test results  - You will receive your results in 7 to 10 business days by phone, letter or MyChart.    For questions or appointments, call:  [facility] Endoscopy   [phone], option 2  Monday through Friday, 7 a.m. to 5 p.m.  (If it is after hours please reach out to the clinic or provider you were scheduled with.)    You should be aware that your endoscopist may be a part of a study to improve endoscopy procedures.  As part of a study, pictures gathered from your procedure may be stored and analyzed in a de-identified manner.

## 2025-01-21 NOTE — OR NURSING
Procedure: enteroscopy with EUS and FNA of perigastric mass  Sedation:  monitored anesthesia care  Specimens: verified, sent with pathology team.   O2: per anesthesia  Tolerated procedure: well  Pt to recovery area in stable condition accompanied by RN and crna, bedside report given to recovery RN  Other:  eus balloon intact on scope withdrawl    All belongings with patient at time of transfer.

## 2025-01-21 NOTE — ANESTHESIA PREPROCEDURE EVALUATION
Anesthesia Pre-Procedure Evaluation    Patient: Alvin Parra   MRN: [id number] : 1940        Procedure : Procedure(s):  Endoscopic ultrasound upper gastrointestinal tract (GI)        Past Medical History:   Diagnosis Date    H/O prostatectomy     Prostate cancer (H)       Past Surgical History:   Procedure Laterality Date    COLONOSCOPY N/A 2024    Procedure: Colonoscopy with polypectomy;  Surgeon: Alexander Prado MD;  Location: [facility] OR    ESOPHAGOSCOPY, GASTROSCOPY, DUODENOSCOPY (EGD), COMBINED N/A 2024    Procedure: Esophagoscopy, gastroscopy, duodenoscopy (EGD), combined with biopsy and savory dilation;  Surgeon: Alexander Prado MD;  Location: [facility] OR    PROSTATECTOMY PERINEAL  2011      Allergies   Allergen Reactions    Atorvastatin     Azithromycin     Fenofibrate     Penicillins     Simvastatin       Social History     Tobacco Use    Smoking status: Never    Smokeless tobacco: Never   Substance Use Topics    Alcohol use: Yes     Comment: occasional      Wt Readings from Last 1 Encounters:   24 64 kg (141 lb)        Anesthesia Evaluation   Pt has had prior anesthetic.     No history of anesthetic complications       ROS/MED HX  ENT/Pulmonary:       Neurologic:  - neg neurologic ROS     Cardiovascular:     (+)  hypertension- -   -  - -                                 Previous cardiac testing   Echo: Date:  Results:  There is no prior study for comparison.   Normal left ventricular systolic function. No focal wall motion abnormalities   demonstrated. Ejection fraction is measured by Christian's biplane at 61 %. Diastolic   indices are within normal limits for age.   Mitral valve leaflets appear mildly thickened.   Mild aortic valve sclerosis without evidence of aortic stenosis.   Right ventricular systolic pressure is consistent with mild pulmonary hypertension.   Estimated peak RVSP is 35 mmHg. There is mild tricuspid regurgitation.   Mildly elevated pulmonary flow velocity.  Highest velocity reported at 191 cm/s. There is   trace to mild pulmonic regurgitation.     Stress Test:  Date: Results:    ECG Reviewed:  Date: 2024 Results:  NSR  Cath:  Date: Results:   (-) CAD, CHF, arrhythmias and dyslipidemia   METS/Exercise Tolerance: >4 METS    Hematologic:    (-) history of blood clots and anemia   Musculoskeletal:       GI/Hepatic: Comment: EGD for dysphagia as patient is having issues swallowing solid and liquids. EGD completed 9/2024 did not show any food in esophagus or stomach or any obvious pathology. He underwent empiric dilation but is back with recurrence in symptoms.    (+) GERD, Asymptomatic on medication,               (-) liver disease   Renal/Genitourinary:    (-) renal disease   Endo:  - neg endo ROS     Psychiatric/Substance Use:     (+) psychiatric history anxiety       Infectious Disease:       Malignancy:  - neg malignancy ROS     Other:            Physical Exam    Airway        Mallampati: II   TM distance: > 3 FB   Neck ROM: full   Mouth opening: > 3 cm    Respiratory Devices and Support         Dental       (+) Minor Abnormalities - some fillings, tiny chips      Cardiovascular   cardiovascular exam normal       Rhythm and rate: regular     Pulmonary   pulmonary exam normal                Labs reviewed from 12/2024 in care everywhere    Anesthesia Plan    ASA Status:  2    NPO Status:  NPO Appropriate    Anesthesia Type: MAC.     - Reason for MAC: immobility needed   Induction: Propofol.   Maintenance: TIVA.   Techniques and Equipment:     - Lines/Monitors: BIS     Consents    Anesthesia Plan(s) and associated risks, benefits, and realistic alternatives discussed. Questions answered and patient/representative(s) expressed understanding.     - Discussed: Risks, Benefits and Alternatives for BOTH SEDATION and the PROCEDURE were discussed     - Discussed with:  Patient      - Specific Concerns: Anesthetic risks discussed but not limited to corneal abrasion, dental injury,  hypoxia, MI, low blood pressure, aspiration, conversion to general anesthesia, and stroke..           Postoperative Care    Pain management: IV analgesics.   PONV prophylaxis: Ondansetron (or other 5HT-3)     Comments:    Other Comments: Given previous EGD findings with normal esophagus and no food content, I think its reasonable to proceed with sedation. Will have glidescope in the room to secure airway if solid food is found upon scope insertion.           Lobo Cohn MD    I have reviewed the pertinent notes and labs in the chart from the past 30 days and (re)examined the patient.  Any updates or changes from those notes are reflected in this note.

## 2025-01-21 NOTE — ANESTHESIA POSTPROCEDURE EVALUATION
Patient: Alvin Parra    Procedure: Procedure(s):  ESOPHAGOGASTRODUODENOSCOPY, WITH FINE NEEDLE ASPIRATION BIOPSY, WITH ENDOSCOPIC ULTRASOUND GUIDANCE  Enteroscopy small bowel       Anesthesia Type:  MAC    Note:  Disposition: Outpatient   Postop Pain Control: Uneventful            Sign Out: Well controlled pain   PONV: No   Neuro/Psych: Uneventful            Sign Out: Acceptable/Baseline neuro status   Airway/Respiratory: Uneventful            Sign Out: Acceptable/Baseline resp. status   CV/Hemodynamics: Uneventful            Sign Out: Acceptable CV status; No obvious hypovolemia; No obvious fluid overload   Other NRE: NONE   DID A NON-ROUTINE EVENT OCCUR? No           Last vitals:  Vitals Value Taken Time   /72 01/21/25 1400   Temp     Pulse     Resp     SpO2 100 % 01/21/25 1404   Vitals shown include unfiled device data.    Electronically Signed By: Lobo Cohn MD  January 21, 2025  2:04 PM

## 2025-01-21 NOTE — H&P
Gastroenterology Pre-op History and Physical    Alvin Parra MRN# [id number]   Age: 84 year old YOB: 1940      Date of Surgery: 01/21/25  [facility]      Date of Exam 1/21/2025 Facility Same Day       Primary care provider: Alexx Che         Chief Complaint and/or Reason for Procedure:     85 yo M with a PMH of chronic abd pain and weight loss. He underwent EGD on 9/30/24 that noted gastric antral submucosal nodule but biopsies were non-diagnostic. 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 Borderline distended distal descending duodenum/proximal 3rd portion of the duodenum with narrowing of the 3rd portion of the duodenum between the abdominal aorta and the SMA/SMV without evidence for high-grade obstruction at   this level.          Past Medical and Surgical History:     Past Medical History:   Diagnosis Date    H/O prostatectomy     Prostate cancer (H)      Past Surgical History:   Procedure Laterality Date    COLONOSCOPY N/A 9/30/2024    Procedure: Colonoscopy with polypectomy;  Surgeon: Alexander Prado MD;  [place] [facility] [place]    ESOPHAGOSCOPY, GASTROSCOPY, DUODENOSCOPY (EGD), COMBINED N/A 9/30/2024    Procedure: Esophagoscopy, gastroscopy, duodenoscopy (EGD), combined with biopsy and savory dilation;  Surgeon: Alexander Prado MD;  [place] [facility]    PROSTATECTOMY PERINEAL  2011            Medications (include herbals and vitamins):        Medications Prior to Admission   Medication Sig Dispense Refill Last Dose/Taking    bisacodyl (DULCOLAX) 5 MG EC tablet One day prior to procedure at 3PM take two (2) tablets. If your procedure is BEFORE 11AM, take two (2) tablets at 11PM. If your procedure is AFTER 11AM, day of procedure take (2) tablets at 6AM. 4 tablet 0 More than a month    pantoprazole (PROTONIX) 40 MG EC tablet Take 40 mg by mouth  2 times daily   1/20/2025 Morning    polyethylene glycol (GOLYTELY) 236 g suspension One day prior to procedure at 3 pm fill container with water. Cover and shake until mixed well. Drink an 8 oz. glass of mixture every 10-15 minutes until the 1st half of the jug is gone. Place the remainder of the Golytely in the refrigerator. At 8 pm, drink the 2nd half of the jug of Golytely bowel prep. Drink an 8 oz. glass of Golytely every 10-15 minutes until the container of Golytely is gone. 4000 mL 0 More than a month    polyethylene glycol (MIRALAX) 17 GM/Dose powder Take 1 capful (17g) of Miralax mixed with 8 oz of a clear liquid twice daily for 7 days prior to your scheduled procedure. 238 g 0 More than a month    VITAMIN D, CHOLECALCIFEROL, PO Take by mouth daily.   1/20/2025 Morning    IBUPROFEN PO Take by mouth as needed for moderate pain.       vitamin E (TOCOPHEROL) 1000 units (450 mg) CAPS capsule Take 1,000 Units by mouth daily                Allergies:      Allergies   Allergen Reactions    Atorvastatin     Azithromycin     Fenofibrate     Penicillins     Simvastatin                Physical Exam:   All vitals have been reviewed  Patient Vitals for the past 8 hrs:   BP Resp SpO2   01/21/25 1116 127/85 16 100 %     No intake/output data recorded.    General: Lying in bed, in NAD  ENT: Normocephalic, atraumatic   Lungs: Normal work of breathing   Cardiovascular: Normal rate, regular rhythm   Abdomen: Soft, non-tender             Anesthetic risk and/or ASA classification:   ASA: 2    Alexander Prado MD

## 2025-01-21 NOTE — DISCHARGE INSTRUCTIONS
Discharge Instructions after Endoscopic Ultrasound    Activity  You were given medicine for pain. You may be dizzy or sleepy.    For 24 hours:  Do not drive or use heavy equipment.  Do not make important decisions.  Do not drink any alcohol.    Diet  Wait one hour before eating or drinking. Start with sips of water. When your gag reflex has returned you  may go back to your usual diet, medicines and light exercise.    Discomfort  Some bloating is normal. You may have large burps or pass air.  You may have a sore throat for 2 to 3 days. It may help to:  Avoid hot liquids for 24 hours.  Use sore throat lozenges.  Gargle as needed with salt water up to 4 times a day. Mix 1 cup of warm water with 1 teaspoon of salt. Do not swallow.  You may take Tylenol (acetaminophen) for pain unless your doctor has told you not to.    Do not take aspirin or ibuprofen (Advil, Motrin, or other anti-inflammatory  drugs) for _____ days.    Follow-up  ___ We took small tissue or fluid samples to study. We will call you with the results in about 10 working days.    When to call    Call right away if you have:  Severe throat pain or trouble swallowing  Black stools (tar-like looking bowel movement)  Fever above 100.6 F (37.5 C)  Unusual pain in belly or chest not relieved by belching or passing air.    If you vomit blood or have severe pain, go to an emergency room.    If you have questions, call    Monday to Friday, 8 a.m. to 4:30 p.m.:   Central Scheduling Department: [phone]  After hours: Hospital: [phone] (Ask for the GI fellow on call)

## 2025-01-21 NOTE — BRIEF OP NOTE
[facility]    Brief Operative Note    Pre-operative diagnosis: Submucosal lesion of stomach [K31.89]  Post-operative diagnosis Same as pre-operative diagnosis    Procedure: ESOPHAGOGASTRODUODENOSCOPY, WITH FINE NEEDLE ASPIRATION BIOPSY, WITH ENDOSCOPIC ULTRASOUND GUIDANCE, N/A - Esophagus  Enteroscopy small bowel, N/A - Mouth    Surgeon: Surgeons and Role:  Panel 1:     * Ivan Siddiqui MD - Primary  Panel 2:     * Ivan Siddiqui MD - Primary  Anesthesia: MAC   Estimated Blood Loss: None    Drains: None  Specimens:   ID Type Source Tests Collected by Time Destination   1 : aram-gastric mass Fine Needle Aspiration Other FINE NEEDLE ASPIRATE Ivan Siddiqui MD 1/21/2025  1:03 PM      Findings:     Enteroscopy:   - Normal exam without evidence of duodenal stenosis or signs of gastric outlet obstruction   EUS:   - Hypoechoic perigastric mass with hyperechoic shadowing foci suggestive of calcification that was separate from the stomach wall. Fine needle aspiration (using 22 gauge EchoTip) and biopsy (22 gauge Acquire) revealed visible tissue with calcification of unclear significance (not consistent with sarcome or GIST, suggestive of inflammatory etiology).   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     Recommendations:   - Observe patient in GI recovery  prior anticipated home discharge   - Resume previous diet   - Follow pathology results   - Follow in GI clinic for further management   - The findings and recommendations were discussed with the patient.     Complications: None.  Implants: * No implants in log *

## 2025-01-21 NOTE — PROGRESS NOTES
Pre-procedure note:    83 yo M with a PMH of chronic abd pain and weight loss. He underwent EGD on 9/30/24 that noted gastric antral submucosal nodule but biopsies were non-diagnostic. 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 Borderline distended distal descending duodenum/proximal 3rd portion of the duodenum with narrowing of the 3rd portion of the duodenum between the abdominal aorta and the SMA/SMV without evidence for high-grade obstruction at this level.     Ref: Bebe Montgomery PA-C   
OB/GYN

## 2025-01-22 LAB
PATH REPORT.COMMENTS IMP SPEC: ABNORMAL
PATH REPORT.COMMENTS IMP SPEC: ABNORMAL
PATH REPORT.COMMENTS IMP SPEC: YES
PATH REPORT.FINAL DX SPEC: ABNORMAL
PATH REPORT.GROSS SPEC: ABNORMAL
PATH REPORT.MICROSCOPIC SPEC OTHER STN: ABNORMAL

## 2025-01-23 ENCOUNTER — PRE VISIT (OUTPATIENT)
Dept: OTOLARYNGOLOGY | Facility: CLINIC | Age: 85
End: 2025-01-23

## 2025-01-28 LAB
SMALL BOWEL ENTEROSCOPY: NORMAL
UPPER EUS: NORMAL

## 2025-03-05 ENCOUNTER — PREP FOR PROCEDURE (OUTPATIENT)
Dept: OTOLARYNGOLOGY | Facility: CLINIC | Age: 85
End: 2025-03-05
Payer: COMMERCIAL

## 2025-03-05 DIAGNOSIS — K22.0 CRICOPHARYNGEAL ACHALASIA: Primary | ICD-10-CM

## 2025-03-10 ENCOUNTER — TELEPHONE (OUTPATIENT)
Dept: OTOLARYNGOLOGY | Facility: CLINIC | Age: 85
End: 2025-03-10
Payer: COMMERCIAL

## 2025-03-10 ENCOUNTER — MYC MEDICAL ADVICE (OUTPATIENT)
Dept: OTOLARYNGOLOGY | Facility: CLINIC | Age: 85
End: 2025-03-10
Payer: COMMERCIAL

## 2025-03-10 DIAGNOSIS — R13.12 OROPHARYNGEAL DYSPHAGIA: Primary | ICD-10-CM

## 2025-03-10 NOTE — TELEPHONE ENCOUNTER
Left patient a voicemail to schedule surgery for MICROLARYNGOSCOPY with botox injection to the cricopharyngeal muscle, flexible esophagoscopy with Dr. Aparicio - Left Surgery Scheduling line for callback [phone]    Ava Anthony on 3/10/2025 at 12:00 PM

## 2025-03-10 NOTE — TELEPHONE ENCOUNTER
Scheduled surgery with Dr. Aparicio on 3/26    Spoke with: Patient    Surgery is located at [facility]    Patient will be seen for their H&P by their PCP Alexx Che MD within 30 days of surgery - Confirmed PCP on file is up to date     Does patient need a consult before upcoming surgery? No    Anesthesia type: General    Requested Imaging required for surgery: No    Patient needs scheduled for their 6 week post op, Dr. Aparicio requested to coordinate with swallow SLP team on whether video swallow testing is needed same day    Patient will receive their surgery packet via [facility] per their preference    Patient was not provided a start time for surgery & is aware they will receive this information 2-3 days before surgery    Would patient like a sooner surgery date? No    Additional comments: Patient was instructed to call back with any further questions or concerns.     Ava Anthony on 3/10/2025 at 1:16 PM

## 2025-03-18 ENCOUNTER — TELEPHONE (OUTPATIENT)
Dept: OTOLARYNGOLOGY | Facility: CLINIC | Age: 85
End: 2025-03-18
Payer: COMMERCIAL

## 2025-03-18 NOTE — TELEPHONE ENCOUNTER
Patient confirmed scheduled appointment:     Date: 5/8/25  Time: 11:15AM  Appointment Type: Return ENT  Provider: Dr. Solares ([facility]) Mark[place]   [place] [facility] [place]  Testing/imaging:   Additional Notes:

## 2025-03-24 RX ORDER — ONDANSETRON 4 MG/1
4 TABLET, FILM COATED ORAL 2 TIMES DAILY PRN
COMMUNITY
End: 2025-03-24

## 2025-03-24 RX ORDER — MIRTAZAPINE 15 MG/1
15 TABLET, ORALLY DISINTEGRATING ORAL AT BEDTIME
COMMUNITY

## 2025-03-24 RX ORDER — PANTOPRAZOLE SODIUM 40 MG/1
40 TABLET, DELAYED RELEASE ORAL DAILY
COMMUNITY

## 2025-03-25 ENCOUNTER — ANESTHESIA EVENT (OUTPATIENT)
Dept: SURGERY | Facility: CLINIC | Age: 85
End: 2025-03-25
Payer: COMMERCIAL

## 2025-03-25 RX ORDER — DEXAMETHASONE SODIUM PHOSPHATE 4 MG/ML
4 INJECTION, SOLUTION INTRA-ARTICULAR; INTRALESIONAL; INTRAMUSCULAR; INTRAVENOUS; SOFT TISSUE
Status: CANCELLED | OUTPATIENT
Start: 2025-03-25

## 2025-03-25 RX ORDER — OXYCODONE HYDROCHLORIDE 10 MG/1
10 TABLET ORAL
Status: CANCELLED | OUTPATIENT
Start: 2025-03-25

## 2025-03-25 RX ORDER — OXYCODONE HYDROCHLORIDE 5 MG/1
5 TABLET ORAL
Status: CANCELLED | OUTPATIENT
Start: 2025-03-25

## 2025-03-25 RX ORDER — ONDANSETRON 4 MG/1
4 TABLET, ORALLY DISINTEGRATING ORAL EVERY 30 MIN PRN
Status: CANCELLED | OUTPATIENT
Start: 2025-03-25

## 2025-03-25 RX ORDER — NALOXONE HYDROCHLORIDE 0.4 MG/ML
0.1 INJECTION, SOLUTION INTRAMUSCULAR; INTRAVENOUS; SUBCUTANEOUS
Status: CANCELLED | OUTPATIENT
Start: 2025-03-25

## 2025-03-25 RX ORDER — ONDANSETRON 2 MG/ML
4 INJECTION INTRAMUSCULAR; INTRAVENOUS EVERY 30 MIN PRN
Status: CANCELLED | OUTPATIENT
Start: 2025-03-25

## 2025-03-25 ASSESSMENT — ENCOUNTER SYMPTOMS: DYSRHYTHMIAS: 0

## 2025-03-25 NOTE — ANESTHESIA PREPROCEDURE EVALUATION
Anesthesia Pre-Procedure Evaluation    Patient: Alvin Parra   MRN: [id number] : 1940        Procedure : Procedure(s):  MICROLARYNGOSCOPY  with botox injection to the cricopharyngeal muscle  flexible esophagoscopy        This is an 85M with severe dysphagia, anxiety, and HTN who presents for botox injection into his CP.  He will get a modified RSI and be intubated with a standard 6.0cETT via a CMAC.     Past Medical History:   Diagnosis Date    H/O prostatectomy     Prostate cancer (H)       Past Surgical History:   Procedure Laterality Date    COLONOSCOPY N/A 2024    Procedure: Colonoscopy with polypectomy;  Surgeon: Alexander Prado MD;  Location: [facility] OR    ENTEROSCOPY SMALL BOWEL N/A 2025    Procedure: Enteroscopy small bowel;  Surgeon: Ivan Siddiqui MD;  Location: [facility]    ESOPHAGOSCOPY, GASTROSCOPY, DUODENOSCOPY (EGD), COMBINED N/A 2024    Procedure: Esophagoscopy, gastroscopy, duodenoscopy (EGD), combined with biopsy and savory dilation;  Surgeon: Alexander Prado MD;  Location: [facility] OR    ESOPHAGOSCOPY, GASTROSCOPY, DUODENOSCOPY (EGD), COMBINED N/A 2025    Procedure: ESOPHAGOGASTRODUODENOSCOPY, WITH FINE NEEDLE ASPIRATION BIOPSY, WITH ENDOSCOPIC ULTRASOUND GUIDANCE;  Surgeon: Ivan Siddiqui MD;  Location: [facility]    PROSTATECTOMY PERINEAL  2011      Allergies   Allergen Reactions    Atorvastatin     Azithromycin     Fenofibrate     Penicillins     Simvastatin     Omeprazole Nausea and Vomiting      Social History     Tobacco Use    Smoking status: Never    Smokeless tobacco: Never   Substance Use Topics    Alcohol use: Yes     Comment: occasional      Wt Readings from Last 1 Encounters:   25 64 kg (141 lb)        Anesthesia Evaluation   Pt has had prior anesthetic.     No history of anesthetic complications       ROS/MED HX  ENT/Pulmonary:       Neurologic:  - neg neurologic ROS     Cardiovascular:     (+)  hypertension- -   -  - -                        "          Previous cardiac testing   Echo: Date: 2023 Results:  There is no prior study for comparison.   Normal left ventricular systolic function. No focal wall motion abnormalities   demonstrated. Ejection fraction is measured by Christian's biplane at 61 %. Diastolic   indices are within normal limits for age.   Mitral valve leaflets appear mildly thickened.   Mild aortic valve sclerosis without evidence of aortic stenosis.   Right ventricular systolic pressure is consistent with mild pulmonary hypertension.   Estimated peak RVSP is 35 mmHg. There is mild tricuspid regurgitation.   Mildly elevated pulmonary flow velocity. Highest velocity reported at 191 cm/s. There is   trace to mild pulmonic regurgitation.     Stress Test:  Date: Results:    ECG Reviewed:  Date: 2024 Results:  NSR  Cath:  Date: Results:   (-) CAD, CHF, arrhythmias and dyslipidemia   METS/Exercise Tolerance: >4 METS    Hematologic:    (-) history of blood clots and anemia   Musculoskeletal:       GI/Hepatic: Comment: EGD for dysphagia as patient is having issues swallowing solid and liquids. EGD completed 9/2024 did not show any food in esophagus or stomach or any obvious pathology. He underwent empiric dilation but is back with recurrence in symptoms.    (+) GERD, Asymptomatic on medication,               (-) liver disease   Renal/Genitourinary:    (-) renal disease   Endo:  - neg endo ROS     Psychiatric/Substance Use:     (+) psychiatric history anxiety       Infectious Disease:       Malignancy:  - neg malignancy ROS     Other:            Physical Exam    Airway        Mallampati: III   TM distance: > 3 FB   Neck ROM: full   Mouth opening: > 3 cm    Respiratory Devices and Support         Dental       (+) Minor Abnormalities - some fillings, tiny chips      Cardiovascular   cardiovascular exam normal          Pulmonary   pulmonary exam normal                OUTSIDE LABS:  CBC: No results found for: \"WBC\", \"HGB\", \"HCT\", \"PLT\"  BMP: No results " "found for: \"NA\", \"POTASSIUM\", \"CHLORIDE\", \"CO2\", \"BUN\", \"CR\", \"GLC\"  COAGS: No results found for: \"PTT\", \"INR\", \"FIBR\"  POC: No results found for: \"BGM\", \"HCG\", \"HCGS\"  HEPATIC: No results found for: \"ALBUMIN\", \"PROTTOTAL\", \"ALT\", \"AST\", \"GGT\", \"ALKPHOS\", \"BILITOTAL\", \"BILIDIRECT\", \"KE\"  OTHER: No results found for: \"PH\", \"LACT\", \"A1C\", \"GABRIELE\", \"PHOS\", \"MAG\", \"LIPASE\", \"AMYLASE\", \"TSH\", \"T4\", \"T3\", \"CRP\", \"SED\"    Anesthesia Plan    ASA Status:  2    NPO Status:  NPO Appropriate    Anesthesia Type: General.     - Airway: ETT   Induction: RSI, Intravenous.   Maintenance: TIVA.   Techniques and Equipment:     - Airway: Video-Laryngoscope     - Lines/Monitors: 2nd IV, BIS     Consents    Anesthesia Plan(s) and associated risks, benefits, and realistic alternatives discussed. Questions answered and patient/representative(s) expressed understanding.     - Discussed: Risks, Benefits and Alternatives for BOTH SEDATION and the PROCEDURE were discussed     - Discussed with:  Patient      - Extended Intubation/Ventilatory Support Discussed: No.      - Patient is DNR/DNI Status: No     Use of blood products discussed: No .     Postoperative Care    Pain management: IV analgesics, Oral pain medications, Multi-modal analgesia.   PONV prophylaxis: Ondansetron (or other 5HT-3), Dexamethasone or Solumedrol     Comments:               Michelle Robles, DO    Clinically Significant Risk Factors Present on Admission                                          "

## 2025-03-26 ENCOUNTER — HOSPITAL ENCOUNTER (OUTPATIENT)
Facility: CLINIC | Age: 85
Discharge: HOME | End: 2025-03-26
Attending: OTOLARYNGOLOGY | Admitting: OTOLARYNGOLOGY
Payer: COMMERCIAL

## 2025-03-26 ENCOUNTER — ANESTHESIA (OUTPATIENT)
Dept: SURGERY | Facility: CLINIC | Age: 85
End: 2025-03-26
Payer: COMMERCIAL

## 2025-03-26 VITALS
WEIGHT: 156.97 LBS | HEIGHT: 73 IN | OXYGEN SATURATION: 100 % | SYSTOLIC BLOOD PRESSURE: 127 MMHG | HEART RATE: 75 BPM | RESPIRATION RATE: 16 BRPM | DIASTOLIC BLOOD PRESSURE: 83 MMHG | TEMPERATURE: 97.5 F | BODY MASS INDEX: 20.8 KG/M2

## 2025-03-26 PROCEDURE — 710N000012 HC RECOVERY PHASE 2, PER MINUTE: Performed by: OTOLARYNGOLOGY

## 2025-03-26 PROCEDURE — 370N000017 HC ANESTHESIA TECHNICAL FEE, PER MIN: Performed by: OTOLARYNGOLOGY

## 2025-03-26 PROCEDURE — 250N000009 HC RX 250

## 2025-03-26 PROCEDURE — 710N000010 HC RECOVERY PHASE 1, LEVEL 2, PER MIN: Performed by: OTOLARYNGOLOGY

## 2025-03-26 PROCEDURE — 360N000076 HC SURGERY LEVEL 3, PER MIN: Performed by: OTOLARYNGOLOGY

## 2025-03-26 PROCEDURE — 250N000025 HC SEVOFLURANE, PER MIN: Performed by: OTOLARYNGOLOGY

## 2025-03-26 PROCEDURE — 999N000141 HC STATISTIC PRE-PROCEDURE NURSING ASSESSMENT: Performed by: OTOLARYNGOLOGY

## 2025-03-26 PROCEDURE — C1769 GUIDE WIRE: HCPCS | Performed by: OTOLARYNGOLOGY

## 2025-03-26 PROCEDURE — 258N000003 HC RX IP 258 OP 636

## 2025-03-26 PROCEDURE — 272N000001 HC OR GENERAL SUPPLY STERILE: Performed by: OTOLARYNGOLOGY

## 2025-03-26 PROCEDURE — 250N000011 HC RX IP 250 OP 636

## 2025-03-26 PROCEDURE — 250N000020 HC RX IP 250 OP 636 J0585: Mod: JW | Performed by: OTOLARYNGOLOGY

## 2025-03-26 RX ORDER — FENTANYL CITRATE 50 UG/ML
50 INJECTION, SOLUTION INTRAMUSCULAR; INTRAVENOUS EVERY 5 MIN PRN
Status: DISCONTINUED | OUTPATIENT
Start: 2025-03-26 | End: 2025-03-26 | Stop reason: HOSPADM

## 2025-03-26 RX ORDER — LIDOCAINE 40 MG/G
CREAM TOPICAL
Status: DISCONTINUED | OUTPATIENT
Start: 2025-03-26 | End: 2025-03-26 | Stop reason: HOSPADM

## 2025-03-26 RX ORDER — HYDROMORPHONE HCL IN WATER/PF 6 MG/30 ML
0.2 PATIENT CONTROLLED ANALGESIA SYRINGE INTRAVENOUS EVERY 5 MIN PRN
Status: DISCONTINUED | OUTPATIENT
Start: 2025-03-26 | End: 2025-03-26 | Stop reason: HOSPADM

## 2025-03-26 RX ORDER — LIDOCAINE HYDROCHLORIDE 20 MG/ML
INJECTION, SOLUTION INFILTRATION; PERINEURAL PRN
Status: DISCONTINUED | OUTPATIENT
Start: 2025-03-26 | End: 2025-03-26

## 2025-03-26 RX ORDER — DEXAMETHASONE SODIUM PHOSPHATE 4 MG/ML
4 INJECTION, SOLUTION INTRA-ARTICULAR; INTRALESIONAL; INTRAMUSCULAR; INTRAVENOUS; SOFT TISSUE
Status: DISCONTINUED | OUTPATIENT
Start: 2025-03-26 | End: 2025-03-26 | Stop reason: HOSPADM

## 2025-03-26 RX ORDER — PROPOFOL 10 MG/ML
INJECTION, EMULSION INTRAVENOUS CONTINUOUS PRN
Status: DISCONTINUED | OUTPATIENT
Start: 2025-03-26 | End: 2025-03-26

## 2025-03-26 RX ORDER — DEXAMETHASONE SODIUM PHOSPHATE 4 MG/ML
INJECTION, SOLUTION INTRA-ARTICULAR; INTRALESIONAL; INTRAMUSCULAR; INTRAVENOUS; SOFT TISSUE PRN
Status: DISCONTINUED | OUTPATIENT
Start: 2025-03-26 | End: 2025-03-26

## 2025-03-26 RX ORDER — ONDANSETRON 2 MG/ML
4 INJECTION INTRAMUSCULAR; INTRAVENOUS EVERY 30 MIN PRN
Status: DISCONTINUED | OUTPATIENT
Start: 2025-03-26 | End: 2025-03-26 | Stop reason: HOSPADM

## 2025-03-26 RX ORDER — ONDANSETRON 4 MG/1
4 TABLET, ORALLY DISINTEGRATING ORAL EVERY 30 MIN PRN
Status: DISCONTINUED | OUTPATIENT
Start: 2025-03-26 | End: 2025-03-26 | Stop reason: HOSPADM

## 2025-03-26 RX ORDER — HYDROMORPHONE HCL IN WATER/PF 6 MG/30 ML
0.4 PATIENT CONTROLLED ANALGESIA SYRINGE INTRAVENOUS EVERY 5 MIN PRN
Status: DISCONTINUED | OUTPATIENT
Start: 2025-03-26 | End: 2025-03-26 | Stop reason: HOSPADM

## 2025-03-26 RX ORDER — SODIUM CHLORIDE, SODIUM LACTATE, POTASSIUM CHLORIDE, CALCIUM CHLORIDE 600; 310; 30; 20 MG/100ML; MG/100ML; MG/100ML; MG/100ML
INJECTION, SOLUTION INTRAVENOUS CONTINUOUS
Status: DISCONTINUED | OUTPATIENT
Start: 2025-03-26 | End: 2025-03-26 | Stop reason: HOSPADM

## 2025-03-26 RX ORDER — FENTANYL CITRATE 50 UG/ML
25 INJECTION, SOLUTION INTRAMUSCULAR; INTRAVENOUS EVERY 5 MIN PRN
Status: DISCONTINUED | OUTPATIENT
Start: 2025-03-26 | End: 2025-03-26 | Stop reason: HOSPADM

## 2025-03-26 RX ORDER — PROPOFOL 10 MG/ML
INJECTION, EMULSION INTRAVENOUS PRN
Status: DISCONTINUED | OUTPATIENT
Start: 2025-03-26 | End: 2025-03-26

## 2025-03-26 RX ORDER — FENTANYL CITRATE 50 UG/ML
INJECTION, SOLUTION INTRAMUSCULAR; INTRAVENOUS PRN
Status: DISCONTINUED | OUTPATIENT
Start: 2025-03-26 | End: 2025-03-26

## 2025-03-26 RX ORDER — NALOXONE HYDROCHLORIDE 0.4 MG/ML
0.1 INJECTION, SOLUTION INTRAMUSCULAR; INTRAVENOUS; SUBCUTANEOUS
Status: DISCONTINUED | OUTPATIENT
Start: 2025-03-26 | End: 2025-03-26 | Stop reason: HOSPADM

## 2025-03-26 RX ORDER — ONDANSETRON 2 MG/ML
INJECTION INTRAMUSCULAR; INTRAVENOUS PRN
Status: DISCONTINUED | OUTPATIENT
Start: 2025-03-26 | End: 2025-03-26

## 2025-03-26 RX ORDER — EPHEDRINE SULFATE 50 MG/ML
INJECTION, SOLUTION INTRAMUSCULAR; INTRAVENOUS; SUBCUTANEOUS PRN
Status: DISCONTINUED | OUTPATIENT
Start: 2025-03-26 | End: 2025-03-26

## 2025-03-26 RX ADMIN — FENTANYL CITRATE 100 MCG: 50 INJECTION INTRAMUSCULAR; INTRAVENOUS at 10:21

## 2025-03-26 RX ADMIN — Medication 20 MG: at 10:39

## 2025-03-26 RX ADMIN — FENTANYL CITRATE 25 MCG: 50 INJECTION INTRAMUSCULAR; INTRAVENOUS at 11:26

## 2025-03-26 RX ADMIN — FENTANYL CITRATE 25 MCG: 50 INJECTION INTRAMUSCULAR; INTRAVENOUS at 11:28

## 2025-03-26 RX ADMIN — DEXAMETHASONE SODIUM PHOSPHATE 4 MG: 4 INJECTION, SOLUTION INTRA-ARTICULAR; INTRALESIONAL; INTRAMUSCULAR; INTRAVENOUS; SOFT TISSUE at 10:39

## 2025-03-26 RX ADMIN — PROPOFOL 90 MCG/KG/MIN: 10 INJECTION, EMULSION INTRAVENOUS at 10:29

## 2025-03-26 RX ADMIN — Medication 10 MG: at 11:28

## 2025-03-26 RX ADMIN — PHENYLEPHRINE HYDROCHLORIDE 200 MCG: 10 INJECTION INTRAVENOUS at 10:35

## 2025-03-26 RX ADMIN — PHENYLEPHRINE HYDROCHLORIDE 200 MCG: 10 INJECTION INTRAVENOUS at 10:38

## 2025-03-26 RX ADMIN — PHENYLEPHRINE HYDROCHLORIDE 200 MCG: 10 INJECTION INTRAVENOUS at 10:21

## 2025-03-26 RX ADMIN — EPHEDRINE SULFATE 5 MG: 5 INJECTION INTRAVENOUS at 10:37

## 2025-03-26 RX ADMIN — LIDOCAINE HYDROCHLORIDE 80 MG: 20 INJECTION, SOLUTION INFILTRATION; PERINEURAL at 10:21

## 2025-03-26 RX ADMIN — PROPOFOL 150 MG: 10 INJECTION, EMULSION INTRAVENOUS at 10:21

## 2025-03-26 RX ADMIN — EPHEDRINE SULFATE 5 MG: 5 INJECTION INTRAVENOUS at 11:04

## 2025-03-26 RX ADMIN — PHENYLEPHRINE HYDROCHLORIDE 150 MCG: 10 INJECTION INTRAVENOUS at 11:01

## 2025-03-26 RX ADMIN — Medication 50 MG: at 10:21

## 2025-03-26 RX ADMIN — SODIUM CHLORIDE, SODIUM LACTATE, POTASSIUM CHLORIDE, AND CALCIUM CHLORIDE: .6; .31; .03; .02 INJECTION, SOLUTION INTRAVENOUS at 10:18

## 2025-03-26 RX ADMIN — ONDANSETRON 4 MG: 2 INJECTION INTRAMUSCULAR; INTRAVENOUS at 11:53

## 2025-03-26 RX ADMIN — Medication 200 MG: at 11:56

## 2025-03-26 RX ADMIN — Medication 20 MG: at 11:04

## 2025-03-26 ASSESSMENT — ACTIVITIES OF DAILY LIVING (ADL)
ADLS_ACUITY_SCORE: 39
ADLS_ACUITY_SCORE: 35
ADLS_ACUITY_SCORE: 39

## 2025-03-26 NOTE — DISCHARGE INSTRUCTIONS
Contacting your Doctor -   To contact a doctor, call Dr. Aparicio's office at [phone]    or:  [phone] and ask for the resident on call for Otolaryngology (answered 24 hours a day)   Emergency Department:  [facility]: [phone]  [facility]: [phone] 911 if you are in need of immediate or emergent help

## 2025-03-26 NOTE — ANESTHESIA PROCEDURE NOTES
Airway       Patient location during procedure: OR       Procedure Start/Stop Times: 3/26/2025 10:26 AM  Staff -        Anesthesiologist:  Jake Kaiser MD       Resident/Fellow: Michelle Robles DO       Performed By: residentIndications and Patient Condition       Indications for airway management: aram-procedural       Induction type:intravenous       Mask difficulty assessment: 1 - vent by mask    Final Airway Details       Final airway type: endotracheal airway       Successful airway: ETT - single and Oral  Endotracheal Airway Details        ETT size (mm): 5.0       Cuffed: yes       Successful intubation technique: video laryngoscopy       VL Blade Size: MAC 4       Grade View of Cords: 1       Adjucts: stylet       Position: Left       Measured from: gums/teeth       Secured at (cm): 25       Bite block used: None    Post intubation assessment        Placement verified by: capnometry and chest rise        Number of attempts at approach: 1       Number of other approaches attempted: 0       Secured with: tape       Ease of procedure: easy       Dentition: Lips/oral mucosa injury (small upper lip laceration)    Medication(s) Administered   Medication Administration Time: 3/26/2025 10:26 AM

## 2025-03-26 NOTE — ANESTHESIA CARE TRANSFER NOTE
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    Anesthesia Type:   General     Note:    Oropharynx: oropharynx clear of all foreign objects and spontaneously breathing  Level of Consciousness: drowsy  Oxygen Supplementation: face mask  Level of Supplemental Oxygen (L/min / FiO2): 6  Independent Airway: airway patency satisfactory and stable  Dentition: dentition unchanged  Vital Signs Stable: post-procedure vital signs reviewed and stable  Report to RN Given: handoff report given  Patient transferred to: PACU    Handoff Report: Identifed the Patient, Identified the Reponsible Provider, Reviewed the pertinent medical history, Discussed the surgical course, Reviewed Intra-OP anesthesia mangement and issues during anesthesia, Set expectations for post-procedure period and Allowed opportunity for questions and acknowledgement of understanding      Vitals:  Vitals Value Taken Time   /76 03/26/25 1216   Temp     Pulse 64 03/26/25 1220   Resp     SpO2 100 % 03/26/25 1220   Vitals shown include unfiled device data.    Electronically Signed By: Michelle Robles DO  March 26, 2025  12:21 PM

## 2025-03-26 NOTE — OP NOTE
Operative Note   Otolaryngology - Head and Neck Surgery       DATE OF OPERATION:   March 26, 2025    PREOPERATIVE DIAGNOSIS:   Cricopharyngeal bar   Dysphagia      POSTOPERATIVE DIAGNOSIS:   Same    NAME OF OPERATION:   1. Microdirect laryngoscopy    2. Flexible esophagoscopy with botox injection to the cricopharyngeal muscle      ANESTHESIA  Type: general   ETT: 5.0 arianna MLT    SURGEON:   Sujatha Aparicio MD    CO-SURGEON:  Alfredo Mcfadden MD  ASSISTANT SURGEON(S):   Stephany Aguilar MD    INDICATIONS FOR PROCEDURE:   The patient is a 85 year old male with cricopharyngeal dysfunction. The risks, benefits and alternatives were discussed. The patient wishes to proceed with surgery and has signed an informed consent.     FINDINGS:   1. Could not expose the UES with the dedo laryngoscope because of spinal osteophytes  2. Then flexible pediatric gastroscope used to enter the esophagus and a savary guide wire was placed  3. Then the adult gastroscope with a cap was used to find the upper esophageal sphincter - this was done by Dr Mcfadden since this wasn't easily found  2. 20units of botox injected  3.  CP bar difficult to expose  Concentration: 100u/1mL  Wasted: 80u    COMPLEXITY  This surgery was more complex than normally because of performing the surgery with history of cervical osteophytes. The approach therefore was more difficult technically than normally.  Significantly more time was required to perform all parts of the operation, especially using both rigid and flexible approaches with both pediatric and adult gastroscope.       DESCRIPTION OF PROCEDURE:   The patient was brought into the operating room and placed supine on the operating room table. A time-out was performed. General anesthesia was induced. The patient was  mask ventilated. Then the patient was intubated with a cmac and 5.0 arianna MLT ETT.     Then the patient was turned 90 degrees from the anesthesiologist. The head was drapped in the usual fashion.  Then the dentition and mucosa were inspected prior to start. A reinforced tooth guard was placed on the upper dentition. The dedo laryngoscope was carefully introduced into the oral cavity and gently passed to the oropharynx. Here the postcricoid area and the cricopharyngeus muscle was exposed and the patient was placed in suspension.     This revealed cervical osteophytes. Photodocumentation was performed.     Then the adult gastroscope was brought into the field. This could not cannulate the esophagus.     Then the flexible was set up again. A flexible esophagoscope was inserted through the oral cavity to the cricopharyngeus muscle and further to the body of the esophagus to the stomach. A savary guide wire was placed.     Then the dedo was removed and an adult gastroscope with  a cap was placed. Here Dr Mcfadden identified the CP bar and 20 units of botox were injected via a flexible injector.     This was the end of our procedure. Afrin soaked pledgets were applied to the surgical site for hemostasis.  The surgical site was then inspected and no residual bleeding was seen. The patient was taken out of suspension. The instrumentation was carefully removed, examining the mucosa and dentition on the way it. The dental guard was removed, and the mucosa and dentition were seen to be in their preoperative state at the conclusion of the procedure. The patient was then handed back to the care of anesthesia who awoke and extubated the patient without complication.    COMPLICATIONS:   None.  .   ESTIMATED BLOOD LOSS:   Less than 10cc    DISPOSITION:   PACU.    SPECIMENS:  * No specimens in log *

## 2025-03-26 NOTE — ANESTHESIA POSTPROCEDURE EVALUATION
Patient: Alvin Parra    Procedure: Procedure(s):  MICROLARYNGOSCOPY  with botox injection to the cricopharyngeal muscle  flexible esophagoscopy       Anesthesia Type:  General    Note:  Disposition: Outpatient   Postop Pain Control: Uneventful            Sign Out: Well controlled pain   PONV: No   Neuro/Psych: Uneventful            Sign Out: Acceptable/Baseline neuro status   Airway/Respiratory: Uneventful            Sign Out: Acceptable/Baseline resp. status   CV/Hemodynamics: Uneventful            Sign Out: Acceptable CV status; No obvious hypovolemia; No obvious fluid overload   Other NRE: NONE   DID A NON-ROUTINE EVENT OCCUR? No           Last vitals:  Vitals Value Taken Time   /70 03/26/25 1330   Temp 36.4  C (97.5  F) 03/26/25 1215   Pulse 63 03/26/25 1337   Resp 19 03/26/25 1337   SpO2 100 % 03/26/25 1333   Vitals shown include unfiled device data.    Electronically Signed By: Vicki Watts DO  March 26, 2025  1:37 PM

## 2025-03-31 ENCOUNTER — TELEPHONE (OUTPATIENT)
Dept: OTOLARYNGOLOGY | Facility: CLINIC | Age: 85
End: 2025-03-31
Payer: COMMERCIAL

## 2025-03-31 ENCOUNTER — PATIENT OUTREACH (OUTPATIENT)
Dept: OTOLARYNGOLOGY | Facility: CLINIC | Age: 85
End: 2025-03-31
Payer: COMMERCIAL

## 2025-03-31 NOTE — TELEPHONE ENCOUNTER
Patient confirmed scheduled appointment:     Date: 5/8/25  Time: 8:00AM  Appointment Type: VFSS  Provider: Dr. Solares ([facility]) Markus   Location: [facility]  Testing/imaging: VFSS SAME DAY  Additional Notes: R/S 5/6/25 VFSS TO SAME DAY DR VISIT

## 2025-05-04 ENCOUNTER — HEALTH MAINTENANCE LETTER (OUTPATIENT)
Age: 85
End: 2025-05-04

## 2025-05-05 NOTE — PROGRESS NOTES
"    [facility]   at the [facility]   Otolaryngology Clinic     Patient: Alvin Parra    MRN: [id number]    : 1940    Age/Gender: 85 year old male  Date of Service: 2025  Rendering Provider:   Sujatha Aparicio MD         Impression & Plan     IMPRESSION: Mr. Parra is a 85 year old male who is being seen for the followin. 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  - discussed options of observation, balloon dilation, botox injection, endoscopic CO2 laser myotomy vs open myotomy   - risks related to microlaryngoscopy include but not limited to bleeding, infection, reaction to the anesthesia, damage to adjacent structures - lips, teeth, tongue, larynx, pharynx, tongue numbness, altered taste, TMJ syndrome, dental injury\", hoarseness, breathing, or swallowing problems  - risks related to the esophagoscopy include but not limited to false passage, esophageal peforation, gastric perforation  - risks related to the CP myotomy include but not limited to bleeding, infection, subcutaneous emphysema, esophageal perforation with complications, extended hospital stay, and need for reoperation  - additional risks related to open CP myotomy include risks to adjacent structures and risks to the recurrent laryngeal nerve with resulting voice and breathing changes, need for repeat operation  - risks related to balloon dilation include but not limited to damage to adjacent structures and esophageal perforation  - risks related to botox injection into the CP muscle " include but not limited to need for repeat injection, diffusion of botox to other nearby muscles resulting in dysphagia, dysphonia and dyspnea  - given he had a dilation prior would not recommend this again  - also discussed given his neck extension a CP myotomy could be difficult if done endoscopically  - patient lives far so is considering botox to temporize symptoms over the winter  - poor cough can improve with EMST  - discussed swallow therapy as well   - s/p MDL esophagoscopy with flex injection of Botox to CP muscle 20 units 3/26/25   -This was a very difficult injection, had help from Dr. Alcocer for the flexible approach  - symptoms 5/8/2025 are not improved, did not feel that Botox helped, did not have side effects either  -Had video swallow earlier today with evidence of increased aspiration and difficulty with solids  - scope shows pooling of saliva  - Fees shows persistent aspiration, pharyngeal wall residue with purée and solid  - Discussed that symptoms are not improved with the Botox dosing likely because it was not high enough and because of the difficulty of the injection with likely a miss of the target  -Discussed options of CP myotomy next however given difficulty with exposure would not recommend rigid approach and given age would not recommend open myotomy therefore discussed flexible approach  - Will send information to Dr Contreras  - Patient in agreement  Plan  - Refer for flexible myotomy  - Continue swallow precautions  - Continue EMST    RETURN VISIT: As needed    Chief Complaint   s/p EGD 1/21/25   Dysphagia  Coughing/Throat clearing  s/p MDL, flex esophagoscopy with 20u botox injection to CP muscle 3/26/25  Interval History   HISTORY OF PRESENT ILLNESS: Mr. Parra is a 85 year old male is being followed for oropharyngeal dysphagia. he was initially seen on 1/23/25. Please refer to this note for full history.        Today, he presents for follow up. he reports:  - Here with his wife who  helps give history  -Symptoms not improved     PAST MEDICAL HISTORY:   Past Medical History:   Diagnosis Date    H/O prostatectomy     Prostate cancer (H)        PAST SURGICAL HISTORY:   Past Surgical History:   Procedure Laterality Date    COLONOSCOPY N/A 9/30/2024    Procedure: Colonoscopy with polypectomy;  Surgeon: Alexander Prado MD;  Location: [facility] OR    ENTEROSCOPY SMALL BOWEL N/A 1/21/2025    Procedure: Enteroscopy small bowel;  Surgeon: Ivan Siddiqui MD;  Location: [facility]    ESOPHAGOSCOPY FLEXIBLE N/A 3/26/2025    Procedure: flexible esophagoscopy;  Surgeon: Sujatha Aparicio MD;  Location: [facility]    ESOPHAGOSCOPY, GASTROSCOPY, DUODENOSCOPY (EGD), COMBINED N/A 9/30/2024    Procedure: Esophagoscopy, gastroscopy, duodenoscopy (EGD), combined with biopsy and savory dilation;  Surgeon: Alexander Prado MD;  Location: [facility] OR    ESOPHAGOSCOPY, GASTROSCOPY, DUODENOSCOPY (EGD), COMBINED N/A 1/21/2025    Procedure: ESOPHAGOGASTRODUODENOSCOPY, WITH FINE NEEDLE ASPIRATION BIOPSY, WITH ENDOSCOPIC ULTRASOUND GUIDANCE;  Surgeon: Ivan Siddiqui MD;  Location: [facility]    INJECT BOTOX N/A 3/26/2025    Procedure: with botox injection to the cricopharyngeal muscle;  Surgeon: Sujatha Aparicio MD;  Location: [facility]    LARYNGOSCOPY WITH MICROSCOPE N/A 3/26/2025    Procedure: MICROLARYNGOSCOPY;  Surgeon: Sujatha Aparicio MD;  Location: [facility]    PROSTATECTOMY PERINEAL  2011       CURRENT MEDICATIONS:   Current Outpatient Medications:     bisacodyl (DULCOLAX) 5 MG EC tablet, One day prior to procedure at 3PM take two (2) tablets. If your procedure is BEFORE 11AM, take two (2) tablets at 11PM. If your procedure is AFTER 11AM, day of procedure take (2) tablets at 6AM., Disp: 4 tablet, Rfl: 0    IBUPROFEN PO, Take by mouth as needed for moderate pain., Disp: , Rfl:     mirtazapine (REMERON SOL-TAB) 15 MG ODT, Take 15 mg by mouth at bedtime., Disp: , Rfl:     pantoprazole (PROTONIX) 40 MG EC tablet, Take 40 mg by mouth daily.,  Disp: , Rfl:     polyethylene glycol (GOLYTELY) 236 g suspension, One day prior to procedure at 3 pm fill container with water. Cover and shake until mixed well. Drink an 8 oz. glass of mixture every 10-15 minutes until the 1st half of the jug is gone. Place the remainder of the Golytely in the refrigerator. At 8 pm, drink the 2nd half of the jug of Golytely bowel prep. Drink an 8 oz. glass of Golytely every 10-15 minutes until the container of Golytely is gone., Disp: 4000 mL, Rfl: 0    polyethylene glycol (MIRALAX) 17 GM/Dose powder, Take 1 capful (17g) of Miralax mixed with 8 oz of a clear liquid twice daily for 7 days prior to your scheduled procedure., Disp: 238 g, Rfl: 0    VITAMIN D, CHOLECALCIFEROL, PO, Take by mouth daily., Disp: , Rfl:     vitamin E (TOCOPHEROL) 1000 units (450 mg) CAPS capsule, Take 1,000 Units by mouth daily., Disp: , Rfl:     ALLERGIES: Atorvastatin, Azithromycin, Fenofibrate, Penicillins, Simvastatin, and Omeprazole    SOCIAL HISTORY:    Social History     Socioeconomic History    Marital status:      Spouse name: Not on file    Number of children: Not on file    Years of education: Not on file    Highest education level: Not on file   Occupational History    Not on file   Tobacco Use    Smoking status: Never    Smokeless tobacco: Never   Vaping Use    Vaping status: Never Used   Substance and Sexual Activity    Alcohol use: Yes     Comment: occasional    Drug use: No    Sexual activity: Not on file   Other Topics Concern    Not on file   Social History Narrative    Not on file     Social Drivers of Health     Financial Resource Strain: Not on file   Food Insecurity: Not on file   Transportation Needs: Not on file   Physical Activity: Sufficiently Active (2/18/2021)    Received from [facility]    Exercise Vital Sign     Days of Exercise per Week: 3 days     Minutes of Exercise per Session: 90 min   Stress: Not on file   Social Connections: Not on file    Interpersonal Safety: Low Risk  (3/26/2025)    Interpersonal Safety     Do you feel physically and emotionally safe where you currently live?: Yes     Within the past 12 months, have you been hit, slapped, kicked or otherwise physically hurt by someone?: No     Within the past 12 months, have you been humiliated or emotionally abused in other ways by your partner or ex-partner?: No   Housing Stability: Not on file         FAMILY HISTORY:   Family History   Problem Relation Age of Onset    Family History Negative Unknown       Non-contributory for problems with anesthesia    REVIEW OF SYSTEMS:   The patient was asked a 14 point review of systems regarding constitutional symptoms, eye symptoms, ears, nose, mouth, throat symptoms, cardiovascular symptoms, respiratory symptoms, gastrointestinal symptoms, genitourinary symptoms, musculoskeletal symptoms, integumentary symptoms, neurological symptoms, psychiatric symptoms, endocrine symptoms, hematologic/lymphatic symptoms, and allergic/ immunologic symptoms.   The pertinent factors have been included in the HPI and below.  Patient Supplied Answers to Review of Systems       No data to display                Physical Examination   The patient underwent a physical examination as described below. The pertinent positive and negative findings are summarized after the description of the examination.  Constitutional: The patient's developmental and nutritional status was assessed. The patient's voice quality was assessed.  Head and Face: The head and face were inspected for deformities. The sinuses were palpated. The salivary glands were palpated. Facial muscle strength was assessed bilaterally.  Eyes: Extraocular movements and primary gaze alignment were assessed.  Ears, Nose, Mouth and Throat: The ears and nose were examined for deformities. The nasal septum, mucosa, and turbinates were inspected by anterior rhinoscopy. The lips, teeth, and gums were examined for  abnormalities. The oral mucosa, tongue, palate, tonsils, lateral and posterior pharynx were inspected for the presence of asymmetry or mucosal lesions.    Neck: The tracheal position was noted, and the neck mass palpated to determine if there were any asymmetries, abnormal neck masses, thyromegally, or thyroid nodules.  Respiratory: The nature of the breathing and chest expansion/symmetry was observed.  Cardiovascular: The patient was examined to determine the presence of any edema or jugular venous distension.  Abdomen: The contour of the abdomen was noted.  Lymphatic: The patient was examined for infraclavicular lymphadenopathy.  Musculoskeletal: The patient was inspected for the presence of skeletal deformities.  Extremities: The extremities were examined for any clubbing or cyanosis.  Skin: The skin was examined for inflammatory or neoplastic conditions.  Neurologic: The patient's orientation, mood, and affect were noted. The cranial nerve  functions were examined.  Other pertinent positive and negative findings on physical examination:   OC/OP: no lesions, uvula midline, soft palate elevates symmetrically   Neck: no lesions, no TH tenderness to palpation     All other physical examination findings were within normal limits and noncontributory.    Procedures   Flexible laryngoscopy (CPT 02856)        Pre-procedure diagnosis: dysphagia  Post-procedure diagnosis: same as above  Indication for procedure: Mr. Parra is a 84 year old male with see above  Procedure(s): Fiberoptic Laryngoscopy     Details of Procedure: After informed consent was obtained, the patient was seated in the examination chair.  The areas of the nasopharynx as well as the hypopharynx were anesthetized with topical 4% lidocaine with 0.25% phenylephrine atomizer.  Examination of the base of tongue was performed first.  Attention was directed to any evidence of masses in the area or evidence of leukoplakia or candidal infection.  Attention was  directed to the epiglottis where its size and position was determined and its movement on phonation of the vowel  e .  The piriform sinuses were then inspected for any mass lesions or pooling of secretions.  Attention was then directed to the larynx. The vocal folds were inspected for infection or any areas of leukoplakia, for masses, polypoid degeneration, or hemorrhage.  Having done this, the arytenoids and vocal processes were inspected for erythema or evidence of granuloma formation.  The posterior commissure was then inspected for evidence of inflammatory changes in the mucosa and heaping up of mucosal tissue. The patient was then instructed to say the vowel  e .  Adduction of vocal folds to the midline was observed for any evidence of paresis or paralysis of the larynx or asymmetry in rotation of the larynx to the left or right. The patient was asked to breathe and the degree of abduction was noted bilaterally.  Subglottic view of the larynx was obtained for any additional mass lesions or mucosal changes.  Finally the post cricoid was examined for evidence of pooling of secretions, as well as the pharyngeal wall mucosa.   Anesthesia type: 0.25% phenylephrine     Findings:  Anatomic/physiological deviations: pooling of saliva                Right vocal process: No restriction of mobility   Left vocal process: No restriction of mobility  Glottal gap: Complete glottal closure  Supraglottic structures: Normal  Hypopharynx: Normal      Estimated Blood Loss: minimal  Complications: None  Disposition: Patient tolerated the procedure well      Fiberoptic Endoscopic Evaluation of Swallowing (CPT 46739)  and Interpretation of Swallowing (CPT 45713)     Indications: See above notes for complete history and physical. Patient complains of dysphagia to both solids and liquids and/or there is suggestion on history and endoscopic exam of the presence of dysphagia causing medical complaints.  Swallowing evaluation is being  "performed to assess the presence and degree of dysphagia, and to recommend a safe diet.     Pulmonary Status:        Yes PNA   Current Diet:                regular                                                    thin liquids      Consistency Amounts:  Thin Liquid: sip   Puree: sip  Solid: cookies            Positioning: upright in a chair  Oral Peripheral Exam: See physical exam section.  Anatomic Notes: See Videostroboscopy report for assessment of anatomy and laryngeal functioning  Pharyngeal secretions prior to administration of liquid or food: No  Oral Phase Abnormal Findings: No abnormal behavior observed  Pharyngeal Phase Abnormal Findings:  persistent aspiration, pharyngeal wall residue with purée and solid        Recommended Diet:  regular                                        thin liquids           Review of Relevant Clinical Data   I personally reviewed:    Labs:  No results found for: \"TSH\"  No results found for: \"NA\", \"CO2\", \"BUN\", \"CREAT\", \"GLUCOSE\", \"PHOS\"  No results found for: \"WBC\", \"HGB\", \"HCT\", \"MCV\", \"PLT\"  No results found for: \"PT\", \"PTT\", \"INR\"  No results found for: \"KANDI\"  No components found for: \"RHEUMATOIDFACTOR\", \"RF\"  No results found for: \"CRP\"  No components found for: \"CKTOT\", \"URICACID\"  No components found for: \"C3\", \"C4\", \"DSDNAAB\", \"NDNAABIFA\"  No results found for: \"MPOAB\"    Patient reported Quality of Life (QOL) Measures   Patient Supplied Answers To VHI Questionnaire       No data to display                  Patient Supplied Answers To EAT Questionnaire       No data to display                  Patient Supplied Answers To CSI Questionnaire       No data to display                  Patient Supplied Answers to Dyspnea Index Questionnaire:       No data to display                     Sujatha Aparicio MD    Laryngology    [facility]  Department of  Otolaryngology - Head and Neck Surgery  [facility]  [address] [place] [address], " [place] [address]  Appointment line: [phone]  Fax: [phone]  [email or web address]

## 2025-05-08 ENCOUNTER — OFFICE VISIT (OUTPATIENT)
Dept: OTOLARYNGOLOGY | Facility: CLINIC | Age: 85
End: 2025-05-08
Payer: COMMERCIAL

## 2025-05-08 ENCOUNTER — THERAPY VISIT (OUTPATIENT)
Dept: SPEECH THERAPY | Facility: CLINIC | Age: 85
End: 2025-05-08
Payer: COMMERCIAL

## 2025-05-08 VITALS — HEIGHT: 73 IN | BODY MASS INDEX: 20.67 KG/M2 | WEIGHT: 156 LBS

## 2025-05-08 DIAGNOSIS — R13.12 DYSPHAGIA, OROPHARYNGEAL PHASE: Primary | ICD-10-CM

## 2025-05-08 DIAGNOSIS — K22.5 ZENKER'S DIVERTICULUM: ICD-10-CM

## 2025-05-08 DIAGNOSIS — R13.10 DYSPHAGIA, UNSPECIFIED TYPE: ICD-10-CM

## 2025-05-08 DIAGNOSIS — R13.12 OROPHARYNGEAL DYSPHAGIA: Primary | ICD-10-CM

## 2025-05-08 PROCEDURE — 92610 EVALUATE SWALLOWING FUNCTION: CPT | Mod: GN | Performed by: SPEECH-LANGUAGE PATHOLOGIST

## 2025-05-08 NOTE — PROGRESS NOTES
SPEECH LANGUAGE PATHOLOGY EVALUATION       Fall Risk Screen:  Fall screen completed by: SLP  Have you fallen 2 or more times in the past year?: No  Have you fallen and had an injury in the past year?: No  Is patient receiving Physical Therapy Services?: No    Subjective        Presenting condition or subjective complaint:   Pt presented for follow up ENT clinic visit with Dr. Aparicio after botox injection into cricopharyngeal muscle. Pt reports limited change in swallowing. He     Date of onset: 01/23/25    Relevant medical history:     Past Medical History:   Diagnosis Date    H/O prostatectomy     Prostate cancer (H)        Dates & types of surgery:    Past Surgical History:   Procedure Laterality Date    COLONOSCOPY N/A 9/30/2024    Procedure: Colonoscopy with polypectomy;  Surgeon: Alexander Prado MD;  Location: [facility]    ENTEROSCOPY SMALL BOWEL N/A 1/21/2025    Procedure: Enteroscopy small bowel;  Surgeon: Ivan Siddiqui MD;  Location: [facility]    ESOPHAGOSCOPY FLEXIBLE N/A 3/26/2025    Procedure: flexible esophagoscopy;  Surgeon: Sujatha Aparicio MD;  [facility]    ESOPHAGOSCOPY, GASTROSCOPY, DUODENOSCOPY (EGD), COMBINED N/A 9/30/2024    Procedure: Esophagoscopy, gastroscopy, duodenoscopy (EGD), combined with biopsy and savory dilation;  Surgeon: Alexander Prado MD;  Location: [facility] OR    ESOPHAGOSCOPY, GASTROSCOPY, DUODENOSCOPY (EGD), COMBINED N/A 1/21/2025    Procedure: ESOPHAGOGASTRODUODENOSCOPY, WITH FINE NEEDLE ASPIRATION BIOPSY, WITH ENDOSCOPIC ULTRASOUND GUIDANCE;  Surgeon: Ivan Siddiqui MD;  [facility]    INJECT BOTOX N/A 3/26/2025    Procedure: with botox injection to the cricopharyngeal muscle;  Surgeon: Sujatha Aparicio MD;  [facility]    LARYNGOSCOPY WITH MICROSCOPE N/A 3/26/2025    Procedure: MICROLARYNGOSCOPY;  Surgeon: Sujatha Aparicio MD;  [facility]    PROSTATECTOMY PERINEAL  2011         Prior diagnostic imaging/testing results:     Video swallow study completed  earlier this date.   Prior therapy history for the same diagnosis, illness or injury:          Pain assessment: Pain denied     Objective         Assessment & Plan   CLINICAL IMPRESSIONS   Medical Diagnosis: oropharyngeal dysphagia, Zenker's diverticulum    Treatment Diagnosis: oropharyngeal dysphagia   Impression/Assessment: Pt is a 85 year old male with dyspahgia complaints. The following significant findings have been identified: impaired swallowing, characterized by aspiration on thin liquid trials after the swallow from residuals in the pharynx. There was residuals on all consistencies in the pharynx after the swallow. Multiple swallows reduced but did not eliminate the residuals. There is not a noted improvement in the swallow function following botox injection. Pt continues to demonstrate aspiration/penetration on thin liquids and per the video swallow study he demonstrates penetration on thicker viscosities. Provided strategies for po intake optimizing high caloric foods/liquids. Pt verbalized understanding. Provided brief training on EMST 150. Pt had not been completing this correctly. He was able to demonstrate. Handouts provided for home completion.     PLAN OF CARE  Recommended Referrals to Other Professionals: Dr. Aparicio is referring to GI  Education Assessment:   Learner/Method: Patient;No Barriers to Learning    Risks and benefits of evaluation/treatment have been explained.   Patient/Family/caregiver agrees with Plan of Care.     Evaluation Time:    SLP Eval: oral/pharyngeal swallow function, clinical minutes (46208): 10    Evaluation Only     Signing Clinician: QUENTIN Aquino

## 2025-05-08 NOTE — LETTER
"2025       RE: Alvin Parra  [address]  [place] [address]     Dear Colleague,    Thank you for referring your patient, Alvin Parra, to the [facility] [place] at [facility]. Please see a copy of my visit note below.        [facility]   at the [facility]   Otolaryngology Clinic     Patient: Alvin Parra    MRN: [id number]    : 1940    Age/Gender: 85 year old male  Date of Service: 2025  Rendering Provider:   Sujatha Aparicio MD         Impression & Plan     IMPRESSION: Mr. Parra is a 85 year old male who is being seen for the followin. 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  - discussed options of observation, balloon dilation, botox injection, endoscopic CO2 laser myotomy vs open myotomy   - risks related to microlaryngoscopy include but not limited to bleeding, infection, reaction to the anesthesia, damage to adjacent structures - lips, teeth, tongue, larynx, pharynx, tongue numbness, altered taste, TMJ syndrome, dental injury\", hoarseness, breathing, or swallowing problems  - risks related to the esophagoscopy include but not limited to false passage, esophageal peforation, gastric perforation  - risks related to the CP myotomy include but not limited to bleeding, infection, subcutaneous emphysema, esophageal perforation with complications, extended hospital stay, and need for reoperation  - additional risks related to open CP myotomy " include risks to adjacent structures and risks to the recurrent laryngeal nerve with resulting voice and breathing changes, need for repeat operation  - risks related to balloon dilation include but not limited to damage to adjacent structures and esophageal perforation  - risks related to botox injection into the CP muscle include but not limited to need for repeat injection, diffusion of botox to other nearby muscles resulting in dysphagia, dysphonia and dyspnea  - given he had a dilation prior would not recommend this again  - also discussed given his neck extension a CP myotomy could be difficult if done endoscopically  - patient lives far so is considering botox to temporize symptoms over the winter  - poor cough can improve with EMST  - discussed swallow therapy as well   - s/p MDL esophagoscopy with flex injection of Botox to CP muscle 20 units 3/26/25   -This was a very difficult injection, had help from Dr. Alcocer for the flexible approach  - symptoms 5/8/2025 are not improved, did not feel that Botox helped, did not have side effects either  -Had video swallow earlier today with evidence of increased aspiration and difficulty with solids  - scope shows pooling of saliva  - Fees shows persistent aspiration, pharyngeal wall residue with purée and solid  - Discussed that symptoms are not improved with the Botox dosing likely because it was not high enough and because of the difficulty of the injection with likely a miss of the target  -Discussed options of CP myotomy next however given difficulty with exposure would not recommend rigid approach and given age would not recommend open myotomy therefore discussed flexible approach  - Will send information to Dr Contreras  - Patient in agreement  Plan  - Refer for flexible myotomy  - Continue swallow precautions  - Continue EMST    RETURN VISIT: As needed    Chief Complaint   s/p EGD 1/21/25   Dysphagia  Coughing/Throat clearing  s/p MDL, flex esophagoscopy with 20u  botox injection to CP muscle 3/26/25  Interval History   HISTORY OF PRESENT ILLNESS: Mr. Parra is a 85 year old male is being followed for oropharyngeal dysphagia. he was initially seen on 1/23/25. Please refer to this note for full history.        Today, he presents for follow up. he reports:  - Here with his wife who helps give history  -Symptoms not improved     PAST MEDICAL HISTORY:   Past Medical History:   Diagnosis Date     H/O prostatectomy      Prostate cancer (H)        PAST SURGICAL HISTORY:   Past Surgical History:   Procedure Laterality Date     COLONOSCOPY N/A 9/30/2024    Procedure: Colonoscopy with polypectomy;  Surgeon: Alexander Prado MD;  Location: [facility]     ENTEROSCOPY SMALL BOWEL N/A 1/21/2025    Procedure: Enteroscopy small bowel;  Surgeon: Ivan Siddiqui MD;  [facility]     ESOPHAGOSCOPY FLEXIBLE N/A 3/26/2025    Procedure: flexible esophagoscopy;  Surgeon: Sujatha Aparicio MD;  [facility]     ESOPHAGOSCOPY, GASTROSCOPY, DUODENOSCOPY (EGD), COMBINED N/A 9/30/2024    Procedure: Esophagoscopy, gastroscopy, duodenoscopy (EGD), combined with biopsy and savory dilation;  Surgeon: Alexander Prado MD;  Location: [facility]     ESOPHAGOSCOPY, GASTROSCOPY, DUODENOSCOPY (EGD), COMBINED N/A 1/21/2025    Procedure: ESOPHAGOGASTRODUODENOSCOPY, WITH FINE NEEDLE ASPIRATION BIOPSY, WITH ENDOSCOPIC ULTRASOUND GUIDANCE;  Surgeon: Ivan Siddiqui MD;  [facility]     INJECT BOTOX N/A 3/26/2025    Procedure: with botox injection to the cricopharyngeal muscle;  Surgeon: Sujatha Aparicio MD;  [facility]     LARYNGOSCOPY WITH MICROSCOPE N/A 3/26/2025    Procedure: MICROLARYNGOSCOPY;  Surgeon: Sujatha Aparicio MD;  [facility]     PROSTATECTOMY PERINEAL  2011       CURRENT MEDICATIONS:   Current Outpatient Medications:      bisacodyl (DULCOLAX) 5 MG EC tablet, One day prior to procedure at 3PM take two (2) tablets. If your procedure is BEFORE 11AM, take two (2) tablets at 11PM. If  your procedure is AFTER 11AM, day of procedure take (2) tablets at 6AM., Disp: 4 tablet, Rfl: 0     IBUPROFEN PO, Take by mouth as needed for moderate pain., Disp: , Rfl:      mirtazapine (REMERON SOL-TAB) 15 MG ODT, Take 15 mg by mouth at bedtime., Disp: , Rfl:      pantoprazole (PROTONIX) 40 MG EC tablet, Take 40 mg by mouth daily., Disp: , Rfl:      polyethylene glycol (GOLYTELY) 236 g suspension, One day prior to procedure at 3 pm fill container with water. Cover and shake until mixed well. Drink an 8 oz. glass of mixture every 10-15 minutes until the 1st half of the jug is gone. Place the remainder of the Golytely in the refrigerator. At 8 pm, drink the 2nd half of the jug of Golytely bowel prep. Drink an 8 oz. glass of Golytely every 10-15 minutes until the container of Golytely is gone., Disp: 4000 mL, Rfl: 0     polyethylene glycol (MIRALAX) 17 GM/Dose powder, Take 1 capful (17g) of Miralax mixed with 8 oz of a clear liquid twice daily for 7 days prior to your scheduled procedure., Disp: 238 g, Rfl: 0     VITAMIN D, CHOLECALCIFEROL, PO, Take by mouth daily., Disp: , Rfl:      vitamin E (TOCOPHEROL) 1000 units (450 mg) CAPS capsule, Take 1,000 Units by mouth daily., Disp: , Rfl:     ALLERGIES: Atorvastatin, Azithromycin, Fenofibrate, Penicillins, Simvastatin, and Omeprazole    SOCIAL HISTORY:    Social History     Socioeconomic History     Marital status:      Spouse name: Not on file     Number of children: Not on file     Years of education: Not on file     Highest education level: Not on file   Occupational History     Not on file   Tobacco Use     Smoking status: Never     Smokeless tobacco: Never   Vaping Use     Vaping status: Never Used   Substance and Sexual Activity     Alcohol use: Yes     Comment: occasional     Drug use: No     Sexual activity: Not on file   Other Topics Concern     Not on file   Social History Narrative     Not on file     Social Drivers of Health     Financial Resource  Strain: Not on file   Food Insecurity: Not on file   Transportation Needs: Not on file   Physical Activity: Sufficiently Active (2/18/2021)    Received from [facility] and [facility]    Exercise Vital Sign      Days of Exercise per Week: 3 days      Minutes of Exercise per Session: 90 min   Stress: Not on file   Social Connections: Not on file   Interpersonal Safety: Low Risk  (3/26/2025)    Interpersonal Safety      Do you feel physically and emotionally safe where you currently live?: Yes      Within the past 12 months, have you been hit, slapped, kicked or otherwise physically hurt by someone?: No      Within the past 12 months, have you been humiliated or emotionally abused in other ways by your partner or ex-partner?: No   Housing Stability: Not on file         FAMILY HISTORY:   Family History   Problem Relation Age of Onset     Family History Negative Unknown       Non-contributory for problems with anesthesia    REVIEW OF SYSTEMS:   The patient was asked a 14 point review of systems regarding constitutional symptoms, eye symptoms, ears, nose, mouth, throat symptoms, cardiovascular symptoms, respiratory symptoms, gastrointestinal symptoms, genitourinary symptoms, musculoskeletal symptoms, integumentary symptoms, neurological symptoms, psychiatric symptoms, endocrine symptoms, hematologic/lymphatic symptoms, and allergic/ immunologic symptoms.   The pertinent factors have been included in the HPI and below.  Patient Supplied Answers to Review of Systems       No data to display                Physical Examination   The patient underwent a physical examination as described below. The pertinent positive and negative findings are summarized after the description of the examination.  Constitutional: The patient's developmental and nutritional status was assessed. The patient's voice quality was assessed.  Head and Face: The head and face were inspected for deformities. The sinuses were palpated. The salivary  glands were palpated. Facial muscle strength was assessed bilaterally.  Eyes: Extraocular movements and primary gaze alignment were assessed.  Ears, Nose, Mouth and Throat: The ears and nose were examined for deformities. The nasal septum, mucosa, and turbinates were inspected by anterior rhinoscopy. The lips, teeth, and gums were examined for abnormalities. The oral mucosa, tongue, palate, tonsils, lateral and posterior pharynx were inspected for the presence of asymmetry or mucosal lesions.    Neck: The tracheal position was noted, and the neck mass palpated to determine if there were any asymmetries, abnormal neck masses, thyromegally, or thyroid nodules.  Respiratory: The nature of the breathing and chest expansion/symmetry was observed.  Cardiovascular: The patient was examined to determine the presence of any edema or jugular venous distension.  Abdomen: The contour of the abdomen was noted.  Lymphatic: The patient was examined for infraclavicular lymphadenopathy.  Musculoskeletal: The patient was inspected for the presence of skeletal deformities.  Extremities: The extremities were examined for any clubbing or cyanosis.  Skin: The skin was examined for inflammatory or neoplastic conditions.  Neurologic: The patient's orientation, mood, and affect were noted. The cranial nerve  functions were examined.  Other pertinent positive and negative findings on physical examination:   OC/OP: no lesions, uvula midline, soft palate elevates symmetrically   Neck: no lesions, no TH tenderness to palpation     All other physical examination findings were within normal limits and noncontributory.    Procedures   Flexible laryngoscopy (CPT 13086)        Pre-procedure diagnosis: dysphagia  Post-procedure diagnosis: same as above  Indication for procedure: Mr. Parra is a 84 year old male with see above  Procedure(s): Fiberoptic Laryngoscopy     Details of Procedure: After informed consent was obtained, the patient was seated  in the examination chair.  The areas of the nasopharynx as well as the hypopharynx were anesthetized with topical 4% lidocaine with 0.25% phenylephrine atomizer.  Examination of the base of tongue was performed first.  Attention was directed to any evidence of masses in the area or evidence of leukoplakia or candidal infection.  Attention was directed to the epiglottis where its size and position was determined and its movement on phonation of the vowel  e .  The piriform sinuses were then inspected for any mass lesions or pooling of secretions.  Attention was then directed to the larynx. The vocal folds were inspected for infection or any areas of leukoplakia, for masses, polypoid degeneration, or hemorrhage.  Having done this, the arytenoids and vocal processes were inspected for erythema or evidence of granuloma formation.  The posterior commissure was then inspected for evidence of inflammatory changes in the mucosa and heaping up of mucosal tissue. The patient was then instructed to say the vowel  e .  Adduction of vocal folds to the midline was observed for any evidence of paresis or paralysis of the larynx or asymmetry in rotation of the larynx to the left or right. The patient was asked to breathe and the degree of abduction was noted bilaterally.  Subglottic view of the larynx was obtained for any additional mass lesions or mucosal changes.  Finally the post cricoid was examined for evidence of pooling of secretions, as well as the pharyngeal wall mucosa.   Anesthesia type: 0.25% phenylephrine     Findings:  Anatomic/physiological deviations: pooling of saliva                Right vocal process: No restriction of mobility   Left vocal process: No restriction of mobility  Glottal gap: Complete glottal closure  Supraglottic structures: Normal  Hypopharynx: Normal      Estimated Blood Loss: minimal  Complications: None  Disposition: Patient tolerated the procedure well      Fiberoptic Endoscopic Evaluation of  "Swallowing (CPT 65090)  and Interpretation of Swallowing (CPT 36823)     Indications: See above notes for complete history and physical. Patient complains of dysphagia to both solids and liquids and/or there is suggestion on history and endoscopic exam of the presence of dysphagia causing medical complaints.  Swallowing evaluation is being performed to assess the presence and degree of dysphagia, and to recommend a safe diet.     Pulmonary Status:        Yes PNA   Current Diet:                regular                                                    thin liquids      Consistency Amounts:  Thin Liquid: sip   Puree: sip  Solid: cookies            Positioning: upright in a chair  Oral Peripheral Exam: See physical exam section.  Anatomic Notes: See Videostroboscopy report for assessment of anatomy and laryngeal functioning  Pharyngeal secretions prior to administration of liquid or food: No  Oral Phase Abnormal Findings: No abnormal behavior observed  Pharyngeal Phase Abnormal Findings:  persistent aspiration, pharyngeal wall residue with purée and solid        Recommended Diet:  regular                                        thin liquids           Review of Relevant Clinical Data   I personally reviewed:    Labs:  No results found for: \"TSH\"  No results found for: \"NA\", \"CO2\", \"BUN\", \"CREAT\", \"GLUCOSE\", \"PHOS\"  No results found for: \"WBC\", \"HGB\", \"HCT\", \"MCV\", \"PLT\"  No results found for: \"PT\", \"PTT\", \"INR\"  No results found for: \"KANDI\"  No components found for: \"RHEUMATOIDFACTOR\", \"RF\"  No results found for: \"CRP\"  No components found for: \"CKTOT\", \"URICACID\"  No components found for: \"C3\", \"C4\", \"DSDNAAB\", \"NDNAABIFA\"  No results found for: \"MPOAB\"    Patient reported Quality of Life (QOL) Measures   Patient Supplied Answers To VHI Questionnaire       No data to display                  Patient Supplied Answers To EAT Questionnaire       No data to display                  Patient Supplied Answers To CSI " Questionnaire       No data to display                  Patient Supplied Answers to Dyspnea Index Questionnaire:       No data to display                     Sujatha Aparicio MD    Laryngology    [facility]  Department of  Otolaryngology - Head and Neck Surgery  [facility] [place] [address]  Appointment line: [phone]  Fax: [phone]  [email or web address]         Again, thank you for allowing me to participate in the care of your patient.      Sincerely,    Sujatha Aparicio MD

## 2025-05-08 NOTE — PATIENT INSTRUCTIONS
1.  You were seen in the ENT Clinic today by . If you have any questions or concerns after your appointment, please call [phone]. Press option #1 for scheduling related needs. Press option #3 for Nurse advice.    2.   has recommended  the following:   - GI referral to Dr. Contreras to discuss zenkers. They will contact you for scheduling.    3.  Plan is to return to clinic as needed.      Anaya Avery LPN  [phone]  [facility] - Otolaryngology

## 2025-05-13 ENCOUNTER — PATIENT OUTREACH (OUTPATIENT)
Dept: CARE COORDINATION | Facility: CLINIC | Age: 85
End: 2025-05-13
Payer: COMMERCIAL

## 2025-05-13 ENCOUNTER — DOCUMENTATION ONLY (OUTPATIENT)
Dept: SPEECH THERAPY | Facility: CLINIC | Age: 85
End: 2025-05-13
Payer: COMMERCIAL

## 2025-06-25 ENCOUNTER — PATIENT OUTREACH (OUTPATIENT)
Dept: GASTROENTEROLOGY | Facility: CLINIC | Age: 85
End: 2025-06-25
Payer: COMMERCIAL

## 2025-06-25 ENCOUNTER — PREP FOR PROCEDURE (OUTPATIENT)
Dept: GASTROENTEROLOGY | Facility: CLINIC | Age: 85
End: 2025-06-25
Payer: COMMERCIAL

## 2025-06-25 DIAGNOSIS — K22.0 CRICOPHARYNGEAL ACHALASIA: Primary | ICD-10-CM

## 2025-06-25 NOTE — TELEPHONE ENCOUNTER
Contacted pt to discuss virtual visit next week 7/2 with Dr Contreras and procedure plan.     Procedure/Imaging/Clinic: EGD with cricopharyngeal myotomy, NG tube placement   Physician: Ben   Timing: next avail   Scope time needed:45 min   Fluoro/C-arm needed: Gen   Anesthesia: Gen   Dx: Cricopharyngeal bar   Tier: 3   Location: [facility]   OK to schedule while attending: yes   Header of letter for pt communication: EGD with cricopharyngeal myotomy, NG tube placement     Comments: Admit for obs afterwards       Pt in agreement with procedure date of 8/8 with Dr Contreras.     Explained OR will call 1-2 days prior to procedure date with arrival time, will need a , someone to stay with them for 24 hours and should stay in town for 24 hours (within 45 min of Hospital) post procedure    Patient needs to get pre-op physical completed. If outside [facility] system will need physical faxed to number [phone]     If you do not get a preop physical, your procedure could be cancelled, patient voiced understanding*    Preop Plan: 7/30/25 Dr Che appointment at [facility] to be utilized.     Does patient have any history of gastric bypass/gastric surgery/altered panc/bili anatomy? none      Med Review    Blood thinner -  none  ASA - none  Diabetic - none  Any meds by injection or mouth for weight loss or diabetes- none    Patient Education r/t procedure: mychart    A pre-op nurse will call 1-2 days prior to the procedure.    NPO/Prep:   24 hours of Liquid diet discussed, strict NPO for 8 hours prior to procedure.     One month follow up visit, virtual, arranged for 9/10 at 9am, ms routed to scheduling.     Verbalized understanding of all instructions. All questions answered.     Procedure order placed, message routed to OR       Jackelyn Garcia, RN, BSN,   [facility]  Care coordinator   [phone]  Fax [phone]

## 2025-07-02 ENCOUNTER — VIRTUAL VISIT (OUTPATIENT)
Dept: GASTROENTEROLOGY | Facility: CLINIC | Age: 85
End: 2025-07-02
Attending: OTOLARYNGOLOGY
Payer: COMMERCIAL

## 2025-07-02 VITALS — HEIGHT: 73 IN | BODY MASS INDEX: 19.88 KG/M2 | WEIGHT: 150 LBS

## 2025-07-02 DIAGNOSIS — K22.5 ZENKER'S DIVERTICULUM: ICD-10-CM

## 2025-07-02 PROBLEM — N39.3 MALE STRESS INCONTINENCE: Status: ACTIVE | Noted: 2022-09-02

## 2025-07-02 PROBLEM — K29.70 GASTRITIS: Status: ACTIVE | Noted: 2022-05-20

## 2025-07-02 PROBLEM — E78.5 HYPERLIPIDEMIA: Status: ACTIVE | Noted: 2022-09-02

## 2025-07-02 PROBLEM — R91.1 PULMONARY NODULE: Status: ACTIVE | Noted: 2024-07-08

## 2025-07-02 PROBLEM — G62.9 PERIPHERAL NEUROPATHY: Status: ACTIVE | Noted: 2022-09-02

## 2025-07-02 PROBLEM — C61 PROSTATE CANCER (H): Status: ACTIVE | Noted: 2022-09-02

## 2025-07-02 PROCEDURE — 1126F AMNT PAIN NOTED NONE PRSNT: CPT | Performed by: INTERNAL MEDICINE

## 2025-07-02 PROCEDURE — 98002 SYNCH AUDIO-VIDEO NEW MOD 45: CPT | Performed by: INTERNAL MEDICINE

## 2025-07-02 ASSESSMENT — PAIN SCALES - GENERAL: PAINLEVEL_OUTOF10: NO PAIN (0)

## 2025-07-02 NOTE — PROGRESS NOTES
Virtual Visit Details    Type of service:  Video Visit   Video Start Time: 7:45 AM  Video End Time:8:22 AM    Originating Location (pt. Location): Home    Distant Location (provider location):  On-site  Platform used for Video Visit: Bulmaro    INTERVENTIONAL ENDOSCOPY OUTPATIENT CLINIC CONSULT  DATE OF SERVICE: 7/2/2025  PROVIDER REQUESTING CONSULT: Sujatha Aparicio  Reason for Consultation: Zenker's diverticulum/CP bar     ASSESSMENT:  Alvin is an 85 year old male with a PMHx of prostate cancer s/p prostatectomy who was referred for endoscopic intervention of a cricopharyngeal bar/Zenker's diverticulum. I reviewed Alvin's swallow studies/esophagram images and Dr. Aparicio's Op note. There is a small Zenker's diverticulum and a very prominent CP bar. We should be able to perform a cricopharyngeal myotomy although the technique may change slightly depending on how big of a pouch there is. We will proceed with a flexible endoscopic diverticulotomy. I explained that efficacy is likely similar to the rigid approach although no comparative data is available (~90-95%). Recurrence rate at 5 years is ~5% but can be managed by a repeat procedure generally. I discussed the main risks of the procedure which include perforation/leak, bleeding, pneumomediastinum/cervical emphysema. I also explained that we will need to admit after the procedure for observation and stay NPO using the NG for medications and nutrition. Discharged will likely be the next day. We will follow up in clinic in 1 month after that.      RECOMMENDATIONS:  - Schedule EGD with Cricopharyngeal myotomy/Zenker's diverticulotomy and NG tube placement in the OR  - Patient will be admitted overnight for observation, NPO, NG tube for nutrition    Malvin Contreras MD  [facility]  Division of Gastroenterology and Hepatology  [facility] [address], [place]  [address]    ________________________________________________________________  HPI:  Alvin is an 85 year old male with a PMHx of prostate cancer s/p prostatectomy who was referred for endoscopic intervention of a cricopharyngeal bar/Zenker's diverticulum. He thinks symptoms started ~2020-21. Initially it was just a tickle in his throat and then more progressively developed dysphagia to solids and liquids. Saliva can even be problematic. Episodes of solid food impaction in the throat with regurgitation. Episodes with nasal regurgitation with sneezing. Has lost 30 lbs over the past year. No episodes of pneumonia. Dr. Aparicio was not able to expose the UES with a laryngoscope due to cervical osteophytes. Dr. Mcfadden was called to her room and he was able to visualize the CP bar endoscopically and they injected 20 units of botox into the CP bar. He did not appreciate any improvement following this procedure. He has also noted chronic nausea/upset stomach. This has improved with Zofran more recently.    PMHx:  Past Medical History:   Diagnosis Date    H/O prostatectomy     Prostate cancer (H)      Patient Active Problem List   Diagnosis    Acute biliary pancreatitis    Acute cholecystitis    Dyslipidemia    GERD (gastroesophageal reflux disease)    Gastritis    History of nephrolithiasis    History of prostate cancer    Hyperlipidemia    Irritable bowel syndrome    Male stress incontinence    Peripheral neuropathy    Pulmonary nodule    Prostate cancer (H)       PSurgHx:  Past Surgical History:   Procedure Laterality Date    COLONOSCOPY N/A 9/30/2024    Procedure: Colonoscopy with polypectomy;  Surgeon: Alexander Prado MD;  Location: [facility] OR    ENTEROSCOPY SMALL BOWEL N/A 1/21/2025    Procedure: Enteroscopy small bowel;  Surgeon: Ivan Siddiqui MD;  Location: [facility]    ESOPHAGOSCOPY FLEXIBLE N/A 3/26/2025    Procedure: flexible esophagoscopy;  Surgeon: Sujatha Aparicio MD;  Location: [facility] OR    ESOPHAGOSCOPY, GASTROSCOPY,  DUODENOSCOPY (EGD), COMBINED N/A 9/30/2024    Procedure: Esophagoscopy, gastroscopy, duodenoscopy (EGD), combined with biopsy and savory dilation;  Surgeon: Alexander Prado MD;  Location: [facility] OR    ESOPHAGOSCOPY, GASTROSCOPY, DUODENOSCOPY (EGD), COMBINED N/A 1/21/2025    Procedure: ESOPHAGOGASTRODUODENOSCOPY, WITH FINE NEEDLE ASPIRATION BIOPSY, WITH ENDOSCOPIC ULTRASOUND GUIDANCE;  Surgeon: Ivan Siddiqui MD;  Location: [facility]    INJECT BOTOX N/A 3/26/2025    Procedure: with botox injection to the cricopharyngeal muscle;  Surgeon: Sujatha Aparicio MD;  Location: [facility]    LARYNGOSCOPY WITH MICROSCOPE N/A 3/26/2025    Procedure: MICROLARYNGOSCOPY;  Surgeon: Sujatha Aparicio MD;  Location: [facility] OR    PROSTATECTOMY PERINEAL  2011       MEDS:  Current Outpatient Medications   Medication Sig Dispense Refill    IBUPROFEN PO Take by mouth as needed for moderate pain.      pantoprazole (PROTONIX) 40 MG EC tablet Take 40 mg by mouth daily.      VITAMIN D, CHOLECALCIFEROL, PO Take by mouth daily.       No current facility-administered medications for this visit.     ALLERGIES:    Allergies   Allergen Reactions    Atorvastatin     Azithromycin     Fenofibrate     Penicillins     Simvastatin     Omeprazole Nausea and Vomiting     FHx:  Family History   Problem Relation Age of Onset    Family History Negative Unknown        SOCIAL Hx:  Social History     Socioeconomic History    Marital status:      Spouse name: Not on file    Number of children: Not on file    Years of education: Not on file    Highest education level: Not on file   Occupational History    Not on file   Tobacco Use    Smoking status: Never    Smokeless tobacco: Never   Vaping Use    Vaping status: Never Used   Substance and Sexual Activity    Alcohol use: Yes     Comment: occasional    Drug use: No    Sexual activity: Not on file   Other Topics Concern    Not on file   Social History Narrative    Not on file     Social Drivers of Health     Financial  "Resource Strain: Not on file   Food Insecurity: Not on file   Transportation Needs: Not on file   Physical Activity: Sufficiently Active (2/18/2021)    Received from [facility] and [facility]    Exercise Vital Sign     Days of Exercise per Week: Not on file     Minutes of Exercise per Session: Not on file   Stress: Not on file   Social Connections: Not on file   Interpersonal Safety: Low Risk  (3/26/2025)    Interpersonal Safety     Do you feel physically and emotionally safe where you currently live?: Yes     Within the past 12 months, have you been hit, slapped, kicked or otherwise physically hurt by someone?: No     Within the past 12 months, have you been humiliated or emotionally abused in other ways by your partner or ex-partner?: No   Housing Stability: Not on file       ROS: A comprehensive Review of Systems was asked and answered in the negative unless specifically commented upon in the HPI    Physical Exam  Ht 1.854 m (6' 1\")   Wt 68 kg (150 lb)   BMI 19.79 kg/m    Body mass index is 19.79 kg/m .  Gen: A&Ox3, NAD  HEENT: Moist mucus membranes, no scleral icterus.  Lungs: no respiratory distress      LABS:  Admission on 01/21/2025, Discharged on 01/21/2025   Component Date Value Ref Range Status    Final Diagnosis 01/21/2025    Final                    Value:A. OTHER, MIRLANDE-GASTRIC MASS, FINE NEEDLE ASPIRATE:  Interpretation -   -Low-grade cartilaginous lesion with partial ossification  -No evidence of epithelial malignancy    Adequacy: Satisfactory for evaluation      Comment 01/21/2025    Final                    Value:There are no high-grade features of malignancy, suggest clinical and radiographic correlation.  Intradepartmental consultation obtained.      Gross Description 01/21/2025    Final                    Value:A(1). Other, mirlande-gastric mass:A. Other, mirlande-gastric mass, Fine Needle Aspirate:  Received are 1 fixed slides, processed for Pap stain, 3 air dried slides, processed for Diff Quik " stain, and material in formalin, processed for one hematoxylin stained cell block.      Microscopic Description 01/21/2025    Final                    Value:  Case was reviewed by the following:  LITZY Resident/Fellow: Pinky Hernandez DO  Pathology Fellow: Pinky Hernandez DO  Pathology Fellow: Caro Larose MD  Resident Pathologist: Rome De La Cruz MD  A resident or fellow in a training program was involved in the initial review, preparation, and/or interpretation of this case.  I, as the senior physician, attest that I have personally reviewed all specimens and or slides, including the listed special stains, and used them with my medical judgement to determine the final diagnosis.              Abnormal Result? 01/21/2025 Yes (A)  No Final    Performing Labs 01/21/2025    Final                    Value:The technical component of this testing was completed at [facility].     Stain controls for all stains resulted within this report have been reviewed and show appropriate reactivity.       SBE 01/21/2025    Final                    Value:[address] [address] [phone]     Endoscopy Department  _______________________________________________________________________________  Patient Name: Alvin Parra             Procedure Date: 1/21/2025 11:27 AM  MRN: [id number]                       Account Number: [id number]  YOB: 1940              Admit Type: Outpatient  Age: 84                                Gender: Male  Note Status: Finalized                Attending MD: BEBO JEAN MD,   Total Sedation Time:                    _______________________________________________________________________________     Procedure:             Small bowel enteroscopy  Indications:           Abnormal abdominal CT. 85 yo M with a PMH of chronic                           abd pain and weight loss. He underwent EGD on 9/30/24                          that noted gastric antral submucosal nodule but                          biopsies were non-diagnostic. 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 Borderline distended distal                          descending duodenum/proximal 3rd portion of the                          duodenum with narrowing of the 3rd portion of the                          duodenum between the abdominal aorta and the SMA/SMV                          without evidence for high-grade obstruction at this                          level.  Providers:             BEBO JEAN MD, Shwetha Villalta RN,                          JG Villagomez MD:          ERIBERTO SALGUERO  Medicines:             Monitored Anesthesia Care  Complications:         No immediate complications.  _______________________________________________________________________________  Procedure:             Pre-Anesthesia Assessment:                         - Prior to                           the procedure, a History and Physical was                          performed, and patient medications, allergies and                          sensitivities were reviewed. The patient's tolerance                          of previous anesthesia was reviewed.                         - ASA Grade Assessment: II - A patient with mild                          systemic disease.                         After obtaining informed consent, the endoscope was                          passed under direct vision. Throughout the procedure,                          the patient's blood pressure, pulse, and oxygen                          saturations were monitored continuously. The                           Colonoscope was introduced through the mouth and                          advanced to the proximal jejunum. The small bowel                          enteroscopy was accomplished without difficulty. The                          patient tolerated the procedure well.                                                                                                             Findings:       The Z-line was regular and was found 38 cm from the incisors.       A small, submucosal deformity with no bleeding and no stigmata of recent        bleeding was found in the gastric antrum.       There was no evidence of significant pathology in the entire examined        duodenum.       There was no evidence of significant pathology in the entire examined        portion of jejunum.                                                                                   Impression:            - Normal exam without evidence of duodenal stenosis or                          signs of gastric outlet obstruction.                         Subepithlial deformity in stomach. See separate EUS                          note.  Recommendation:        - Observe patient in GI recovery unit for possible                          discharge same day.                         - Resume previous diet.                         - Follow pathology results                                                   - Follow in GI clinic for further management                         - The findings and recommendations were discussed with                          the patient.                         - Written discharge instructions were provided to the                          patient.                                                                                     Electronically signed by Dr. Irwin Jean  _______________________  BEBO JEAN MD  1/28/2025 1:14:04 PM  I was physically present for the entire viewing portion of the  exam.  __________________________  Signature of teaching physician  Kulwant/Iris JEAN MD    __________________  ABEDVIN HONG,   Number of Addenda: 0    Note Initiated On: 1/21/2025 11:27 AM  Scope In:  Scope Out:      Upper EUS 01/21/2025    Final                    Value:[address] [place] [address] [phone]     Endoscopy Department  _______________________________________________________________________________  Patient Name: Alvin Parra             Procedure Date: 1/21/2025 11:17 AM  MRN: [id number]                       Account Number: [id number]  YOB: 1940              Admit Type: Outpatient  Age: 84                               Room: [place] [address]  Gender: Male                          Note Status: Finalized  Attending MD: BEBO JEAN MD,  Pause for the Cause: completed  Total Sedation Time:                    _______________________________________________________________________________     Procedure:             Upper EUS  Indications:           Abnormal abdominal/pelvic CT scan. 83 yo M with a PMH                          of chronic abd pain and weight loss. He underwent EGD                          on 9/30/24 that noted gastric antral submucosal nodule                                                    but biopsies were non-diagnostic. 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 Borderline distended distal                          descending duodenum/proximal 3rd portion of the                          duodenum with narrowing of the 3rd portion of the                          duodenum between the abdominal aorta and the SMA/SMV                          without evidence for high-grade obstruction at this                          level.  Providers:              BEBO JEAN MD, Shwetha Villalta RN,                          JG Villagomez MD:          ERIBERTO SALGUERO  Medicines:             Monitored Anesthesia Care  Complications:         No immediate complications.  _______________________________________________________________________________  Procedure:             Pre-Ane                          sthesia Assessment:                         - Prior to the procedure, a History and Physical was                          performed, and patient medications, allergies and                          sensitivities were reviewed. The patient's tolerance                          of previous anesthesia was reviewed.                         - ASA Grade Assessment: II - A patient with mild                          systemic disease.                         After obtaining informed consent, the endoscope was                          passed under direct vision. Throughout the procedure,                          the patient's blood pressure, pulse, and oxygen                          saturations were monitored continuously. The                          Endosonoscope was introduced through the mouth, and                          advanced to the second part of duodenum. The upper EUS                          was accomplished without difficulty. The patient                          tolerated the proc                          edure well.                                                                                   Findings:       ENDOSONOGRAPHIC FINDING: :       An oval mass was identified endosonographically in the perigastric        peritoneal space. The mass was hypoechoic and contained shadowing        hyperechoic foci consistent with calcifications. The mass measured 23 mm        by 16 mm in maximal cross-sectional diameter. The endosonographic        borders were well-defined with an intact interface between the mass and        the stomach  excluding a gastric origin. Fine needle aspiration for        cytology was performed. Color Doppler imaging was utilized prior to        needle puncture to confirm a lack of significant vascular structures        within the needle path. Two passes were made with the 22 gauge EchoTip        needle using a transgastric approach. A cytologist was present and        performed a preliminary cytologic examination. The cellularity of the        specimen was adequate. F                          inal cytology results are pending. Fine needle        biopsy was then performed. Color Doppler imaging was utilized prior to        needle puncture to confirm a lack of significant vascular structures        within the needle path. Two passes were made with the 22 gauge Acquire-S        biopsy needle using a transgastric approach. A visible core of tissue        was obtained. Final cytology results are pending.       A focal anechoic cystic lesion/dilation was seen in the distal common        bile duct near the major papilla which is consistent with a choledochal        cyst. The lesion measured 9 mm by 8 mm in maximal cross-sectional        diameter.       The bile duct was non-dilated and measured 6.3 mm in maximal diameter.        The gallbladder could not be visualized.       The pancreatic parenchyma appeared normal. There were no features of        chronic pancreatitis. No masses, cysts or stones were visualized in the        pancreatic parenchyma. The main pancreatic duct was fo                          llowed from the        major papilla to the body, excluding pancreas divisum. The pancreatic        duct measured 2.9 mm in the head, 4.8 mm in the body and 2.3 mm in the        body of the pancreas.       No lymph nodes were seen in the upper abdomen and mediastinum.       No masses were seen in the visualized portions of the liver.       The left adrenal appeared normal.                                                                                    Impression:            - Hypoechoic perigastric mass with hyperechoic                          shadowing foci suggestive of calcification that was                          separate from the stomach wall. Fine needle aspiration                          (using 22 gauge EchoTip) and biopsy (22 gauge Acquire)                          revealed visible tissue with calcification of unclear                          significance (not preliminariliy suggestive of sarcome                          or GIST, suggestive of inflammatory etiology).                                                   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 This                          is not felt to be of clinical significance.  Recommendation:        - Return patient to hospital [facility] for possible                          discharge same day.                         - Resume previous diet.                         - Follow pathology results                         - Follow in GI clinic for further management                         - The findings and recommendations were discussed with                          the patient.                         - Written discharge instructions were provided to the                          patient.                                                                                     Electronically signed by Dr. Irwin Jean  _______________________  BEBO JEAN MD  1/28/2025 1:16:00 PM  I was physically present for the entire viewing portion of the exam.  _                          _________________________  Signature of teaching physician  B4c/Iris JEAN MD    __________________  JG HONG,   Number of Addenda: 0    Note Initiated On: 1/21/2025 11:17 AM  Scope In:  Scope Out:           IMAGING:  EXAMINATION: XR ESOPHAGRAM SINGLE CONTRAST, 7/30/2024 3:48 PM     History: 84 yM with  progressive dysphagia - eval for causes.; Weight  loss; Dysphagia, unspecified type; Abdominal pain, generalized;  Constipation, unspecified constipation type     Comparison: CT 5/15/2024     Fluoro time: 1.1 minutes.     Prep: NPO for 8 hours prior to the exam.     Technique: A metallic marker was taped to the middle of the patient's  chest. While standing, the patient ingested approximately 200-270 mL  of low density barium over 45-60 seconds. A barium tablet was given,  however the patient was unable to swallow after multiple attempts.      Findings:   Examination of the esophagus reveals no focal strictures, masses or  mucosal abnormalities. The gastroesophageal junction is patent. No  hiatal hernia. Fluoroscopic image was obtained at 1 minute  demonstrating no column of retained barium in the esophagus.     Patient was given a barium tablet; however, he was unable to advanced  the pill passed the oral pharyngeal phase despite multiple attempts  with liquid wash.                                                                      Impression:   Normal timed esophagram with no evidence of barium column at 1 minute.  Patient was unable to perform the barium tablet swallow despite  multiple attempts.     I have personally reviewed the examination and initial interpretation  and I agree with the findings.       Examination:  Modified Barium Swallow Study with Speech Pathology,  5/8/2025     Comparison: Barium swallow study 9/6/2024.     History: 85-year-old with dysphagia     Fluoroscopy time: 3.9  minutes.     Findings: Under fluoroscopic guidance and in the presence of the  speech pathology team, the patient was administered barium in various  consistencies: thin and mildly thickened liquids, pudding, solid  bolus, and half of a barium tablet. Diffuse pharyngeal residue  observed across all consistencies, partially cleared with secondary  swallows.     Laryngeal penetration without aspiration occurred with thin  and mildly  thickened liquids, and trace penetration was seen with pudding. Mild  aspiration was noted with the solid bolus.     The patient was unable to swallow half of a barium tablet with water.      AP imaging revealed a rightward bolus preference with pudding and  symmetric transit with thin liquids. Limited esophageal sweep showed  transient mid-esophageal stasis, which cleared with a thin liquid  rinse. The gastroesophageal junction was patent.                                                                      Impression:  1. Diffuse pharyngeal residue, intermittent laryngeal penetration and  mild aspiration; improved clearance with secondary swallows and  compensatory strategies.     2. No evidence of significant esophageal obstruction.     3. Please see the speech pathologist report for further details.

## 2025-07-02 NOTE — NURSING NOTE
Current patient location: [address] [place] [address]  [facility] [id number]    Is the patient currently in the state of [place]? YES    Visit mode: VIDEO    If the visit is dropped, the patient can be reconnected by:VIDEO VISIT: Send to e-mail at: [email or web address]    Will anyone else be joining the visit? NO  (If patient encounters technical issues they should call [phone]    Are changes needed to the allergy or medication list? No    Are refills needed on medications prescribed by this physician? NO    Rooming Documentation:  Questionnaire(s) not done per department protocol    Reason for visit: Consult    Lisa ARCHULETA

## 2025-07-02 NOTE — PATIENT INSTRUCTIONS
It was a pleasure meeting with you today and discussing your healthcare plan. Below is a summary of what we covered:     RECOMMENDATIONS:  - Schedule EGD with Cricopharyngeal myotomy/Zenker's diverticulotomy and NG tube placement in the OR  - Patient will be admitted overnight for observation, NPO, NG tube for nutrition    Please see below for any additional questions and scheduling guidelines.    Sign up for [facility]rue: [facility]rue patient portal serves as a secure platform for accessing your medical records from the [facility]. Additionally, tr[employer]rue facilitates easy, timely, and secure messaging with your care team. If you have not signed up, you may do so by using the provided code or calling [phone].    Coordinating your care after your visit:  There are multiple options for scheduling your follow-up care based on your provider's recommendation.    How do I schedule a follow-up clinic appointment:   After your appointment, you may receive scheduling assistance with the Clinic Coordinators by having a seat in the waiting room and a Clinic Coordinator will call you up to schedule.  Virtual visits or after you leave the clinic:  Your provider has placed a follow-up order in the [facility]rue portal for scheduling your return appointment. A member of the scheduling team will contact you to schedule.  [facility] [employer] Scheduling: Timely scheduling through [facility]rue is advised to ensure appointment availability.   Call to schedule: You may schedule your follow-up appointment(s) by calling [phone], option 2.    How do I schedule my endoscopy or colonoscopy procedure:  If a procedure, such as a colonoscopy or upper endoscopy was ordered by your provider, the scheduling team will contact you to schedule this procedure. Or you may choose to call to schedule at   [phone], option 1.  Please allow 20-30 minutes when scheduling a procedure.    How do I get my blood work done? To get your blood work done, you need to  schedule a lab appointment at an [facility]. There are multiple ways to schedule:   At the clinic: The Clinic Coordinator you meet after your visit can help you schedule a lab appointment.   [facility] scheduling: [facility] offers online lab scheduling at all [facility] laboratory locations.   Call to schedule: You can call [phone] to schedule your lab appointment.    How do I schedule my imaging study: To schedule imaging studies, such as CT scans, ultrasounds, MRIs, or X-rays, contact Imaging Services at [phone].    How do I schedule a referral to another doctor: If your provider recommended a referral to another specialist(s), the referral order was placed by your provider. You will receive a phone call to schedule this referral, or you may choose to call the number attached to the referral to self-schedule.    For Post-Visit Question(s):  For any inquiries following today's visit:  Please utilize [facility] messaging and allow 48 hours for reply or contact the Call Center during normal business hours at [phone], option 3.  For Emergent After-hours questions, contact the On-Call GI Fellow through the [facility]  at [phone].  In addition, you may contact your Nurse directly using the provided contact information.    Test Results:  Test results will be accessible via [facility] in compliance with the 21st Century Cures Act. This means that your results will be available to you at the same time as your provider. Often you may see your results before your provider does. Results are reviewed by staff within two weeks with communication follow-up. Results may be released in the patient portal prior to your care team review.    Prescription Refill(s):  Medication prescribed by your provider will be addressed during your visit. For future refills, please coordinate with your pharmacy. If you have not had a recent clinic visit or routine labs, for your safety, your  provider may not be able to refill your prescription.           Contacts post-consultation depending on your need:     Schedule Clinic Appointments                        [phone], option 1    Rhonda Garcia, RN Care Coordinator           [phone]     [facility] [place]                           [phone]     GI Procedure Scheduling                               [phone], option 2     For urgent/emergent questions after business hours, you may reach the on-call GI Fellow by contacting the [facility]  at [phone].

## 2025-07-02 NOTE — LETTER
7/2/2025      Alvin Parra  [address]  [place] [address]      Dear Colleague,    Thank you for referring your patient, Alvin Parra, to the [facility]. Please see a copy of my visit note below.    Virtual Visit Details    Type of service:  Video Visit   Video Start Time: 7:45 AM  Video End Time:8:22 AM    Originating Location (pt. Location): Home    Distant Location (provider location):  On-site  Platform used for Video Visit: [facility]    INTERVENTIONAL ENDOSCOPY OUTPATIENT CLINIC CONSULT  DATE OF SERVICE: 7/2/2025  PROVIDER REQUESTING CONSULT: Sujatha Aparicio  Reason for Consultation: Zenker's diverticulum/CP bar     ASSESSMENT:  Alvin is an 85 year old male with a PMHx of prostate cancer s/p prostatectomy who was referred for endoscopic intervention of a cricopharyngeal bar/Zenker's diverticulum. I reviewed Alvin's swallow studies/esophagram images and Dr. Aparicio's Op note. There is a small Zenker's diverticulum and a very prominent CP bar. We should be able to perform a cricopharyngeal myotomy although the technique may change slightly depending on how big of a pouch there is. We will proceed with a flexible endoscopic diverticulotomy. I explained that efficacy is likely similar to the rigid approach although no comparative data is available (~90-95%). Recurrence rate at 5 years is ~5% but can be managed by a repeat procedure generally. I discussed the main risks of the procedure which include perforation/leak, bleeding, pneumomediastinum/cervical emphysema. I also explained that we will need to admit after the procedure for observation and stay NPO using the NG for medications and nutrition. Discharged will likely be the next day. We will follow up in clinic in 1 month after that.      RECOMMENDATIONS:  - Schedule EGD with Cricopharyngeal myotomy/Zenker's diverticulotomy and NG tube placement in the OR  - Patient will be admitted overnight for observation, NPO, NG tube for  nutrition    Malvin Contreras MD  [facility]  Division of Gastroenterology and Hepatology  [facility] [address] [place], [place] [address]    ________________________________________________________________  HPI:  Alvin is an 85 year old male with a PMHx of prostate cancer s/p prostatectomy who was referred for endoscopic intervention of a cricopharyngeal bar/Zenker's diverticulum. He thinks symptoms started ~2020-21. Initially it was just a tickle in his throat and then more progressively developed dysphagia to solids and liquids. Saliva can even be problematic. Episodes of solid food impaction in the throat with regurgitation. Episodes with nasal regurgitation with sneezing. Has lost 30 lbs over the past year. No episodes of pneumonia. Dr. Aparicio was not able to expose the UES with a laryngoscope due to cervical osteophytes. Dr. Mcfadden was called to her room and he was able to visualize the CP bar endoscopically and they injected 20 units of botox into the CP bar. He did not appreciate any improvement following this procedure. He has also noted chronic nausea/upset stomach. This has improved with Zofran more recently.    PMHx:  Past Medical History:   Diagnosis Date     H/O prostatectomy      Prostate cancer (H)      Patient Active Problem List   Diagnosis     Acute biliary pancreatitis     Acute cholecystitis     Dyslipidemia     GERD (gastroesophageal reflux disease)     Gastritis     History of nephrolithiasis     History of prostate cancer     Hyperlipidemia     Irritable bowel syndrome     Male stress incontinence     Peripheral neuropathy     Pulmonary nodule     Prostate cancer (H)       PSurgHx:  Past Surgical History:   Procedure Laterality Date     COLONOSCOPY N/A 9/30/2024    Procedure: Colonoscopy with polypectomy;  Surgeon: Alexander Prado MD;  Location: [facility] OR     ENTEROSCOPY SMALL BOWEL N/A 1/21/2025    Procedure: Enteroscopy small bowel;  Surgeon: Artur  Ivan Gayle MD;  Location: [facility]     ESOPHAGOSCOPY FLEXIBLE N/A 3/26/2025    Procedure: flexible esophagoscopy;  Surgeon: Sujatha Aparicio MD;  Location: [facility] OR     ESOPHAGOSCOPY, GASTROSCOPY, DUODENOSCOPY (EGD), COMBINED N/A 9/30/2024    Procedure: Esophagoscopy, gastroscopy, duodenoscopy (EGD), combined with biopsy and savory dilation;  Surgeon: Alexander Prado MD;  Location: [facility] OR     ESOPHAGOSCOPY, GASTROSCOPY, DUODENOSCOPY (EGD), COMBINED N/A 1/21/2025    Procedure: ESOPHAGOGASTRODUODENOSCOPY, WITH FINE NEEDLE ASPIRATION BIOPSY, WITH ENDOSCOPIC ULTRASOUND GUIDANCE;  Surgeon: Ivan Siddiqui MD;  Location: [facility]     INJECT BOTOX N/A 3/26/2025    Procedure: with botox injection to the cricopharyngeal muscle;  Surgeon: Sujatha Aparicio MD;  Location: [facility]     LARYNGOSCOPY WITH MICROSCOPE N/A 3/26/2025    Procedure: MICROLARYNGOSCOPY;  Surgeon: Sujatha Aparicio MD;  Location: [facility] OR     PROSTATECTOMY PERINEAL  2011       MEDS:  Current Outpatient Medications   Medication Sig Dispense Refill     IBUPROFEN PO Take by mouth as needed for moderate pain.       pantoprazole (PROTONIX) 40 MG EC tablet Take 40 mg by mouth daily.       VITAMIN D, CHOLECALCIFEROL, PO Take by mouth daily.       No current facility-administered medications for this visit.     ALLERGIES:    Allergies   Allergen Reactions     Atorvastatin      Azithromycin      Fenofibrate      Penicillins      Simvastatin      Omeprazole Nausea and Vomiting     FHx:  Family History   Problem Relation Age of Onset     Family History Negative Unknown        SOCIAL Hx:  Social History     Socioeconomic History     Marital status:      Spouse name: Not on file     Number of children: Not on file     Years of education: Not on file     Highest education level: Not on file   Occupational History     Not on file   Tobacco Use     Smoking status: Never     Smokeless tobacco: Never   Vaping Use     Vaping status: Never Used   Substance and Sexual Activity      "Alcohol use: Yes     Comment: occasional     Drug use: No     Sexual activity: Not on file   Other Topics Concern     Not on file   Social History Narrative     Not on file     Social Drivers of Health     Financial Resource Strain: Not on file   Food Insecurity: Not on file   Transportation Needs: Not on file   Physical Activity: Sufficiently Active (2/18/2021)    Received from [facility]    Exercise Vital Sign      Days of Exercise per Week: Not on file      Minutes of Exercise per Session: Not on file   Stress: Not on file   Social Connections: Not on file   Interpersonal Safety: Low Risk  (3/26/2025)    Interpersonal Safety      Do you feel physically and emotionally safe where you currently live?: Yes      Within the past 12 months, have you been hit, slapped, kicked or otherwise physically hurt by someone?: No      Within the past 12 months, have you been humiliated or emotionally abused in other ways by your partner or ex-partner?: No   Housing Stability: Not on file       ROS: A comprehensive Review of Systems was asked and answered in the negative unless specifically commented upon in the HPI    Physical Exam  Ht 1.854 m (6' 1\")   Wt 68 kg (150 lb)   BMI 19.79 kg/m    Body mass index is 19.79 kg/m .  Gen: A&Ox3, NAD  HEENT: Moist mucus membranes, no scleral icterus.  Lungs: no respiratory distress      LABS:  Admission on 01/21/2025, Discharged on 01/21/2025   Component Date Value Ref Range Status     Final Diagnosis 01/21/2025    Final                    Value:A. OTHER, MIRLANDE-GASTRIC MASS, FINE NEEDLE ASPIRATE:  Interpretation -   -Low-grade cartilaginous lesion with partial ossification  -No evidence of epithelial malignancy    Adequacy: Satisfactory for evaluation       Comment 01/21/2025    Final                    Value:There are no high-grade features of malignancy, suggest clinical and radiographic correlation.  Intradepartmental consultation obtained.       Gross Description " 01/21/2025    Final                    Value:A(1). Other, aram-gastric mass:A. Other, aram-gastric mass, Fine Needle Aspirate:  Received are 1 fixed slides, processed for Pap stain, 3 air dried slides, processed for Diff Quik stain, and material in formalin, processed for one hematoxylin stained cell block.       Microscopic Description 01/21/2025    Final                    Value:  Case was reviewed by the following:  LITZY Resident/Fellow: Pinky Hernandez DO  Pathology Fellow: Pinky Hernandez DO  Pathology Fellow: Caro Larose MD  Resident Pathologist: Rome De La Cruz MD  A resident or fellow in a training program was involved in the initial review, preparation, and/or interpretation of this case.  I, as the senior physician, attest that I have personally reviewed all specimens and or slides, including the listed special stains, and used them with my medical judgement to determine the final diagnosis.               Abnormal Result? 01/21/2025 Yes (A)  No Final     Performing Labs 01/21/2025    Final                    Value:The technical component of this testing was completed at [facility].     Stain controls for all stains resulted within this report have been reviewed and show appropriate reactivity.        SBE 01/21/2025    Final                    Value:[address] [address] [phone]     Endoscopy Department  _______________________________________________________________________________  Patient Name: Alvin Parra             Procedure Date: 1/21/2025 11:27 AM  MRN: [id number]                       Account Number: [id number]  YOB: 1940              Admit Type: Outpatient  Age: 84                                Gender: Male  Note Status: Finalized                Attending MD: BEBO JEAN MD,   Total Sedation Time:                     _______________________________________________________________________________     Procedure:             Small bowel enteroscopy  Indications:           Abnormal abdominal CT. 85 yo M with a PMH of chronic                          abd pain and weight loss. He underwent EGD on 9/30/24                          that noted gastric antral submucosal nodule but                          biopsies were non-diagnostic. 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 Borderline distended distal                          descending duodenum/proximal 3rd portion of the                          duodenum with narrowing of the 3rd portion of the                          duodenum between the abdominal aorta and the SMA/SMV                          without evidence for high-grade obstruction at this                          level.  Providers:             BEBO JEAN MD, Shwetha Villalta RN,                          JG Villagomez MD:          ERIBERTO SALGUERO  Medicines:             Monitored Anesthesia Care  Complications:         No immediate complications.  _______________________________________________________________________________  Procedure:             Pre-Anesthesia Assessment:                         - Prior to                           the procedure, a History and Physical was                          performed, and patient medications, allergies and                          sensitivities were reviewed. The patient's tolerance                          of previous anesthesia was reviewed.                         - ASA Grade Assessment: II - A patient with mild                          systemic disease.                         After obtaining informed consent, the endoscope was                           passed under direct vision. Throughout the procedure,                          the patient's blood pressure, pulse, and oxygen                          saturations were monitored continuously. The                          Colonoscope was introduced through the mouth and                          advanced to the proximal jejunum. The small bowel                          enteroscopy was accomplished without difficulty. The                          patient tolerated the procedure well.                                                                                                             Findings:       The Z-line was regular and was found 38 cm from the incisors.       A small, submucosal deformity with no bleeding and no stigmata of recent        bleeding was found in the gastric antrum.       There was no evidence of significant pathology in the entire examined        duodenum.       There was no evidence of significant pathology in the entire examined        portion of jejunum.                                                                                   Impression:            - Normal exam without evidence of duodenal stenosis or                          signs of gastric outlet obstruction.                         Subepithlial deformity in stomach. See separate EUS                          note.  Recommendation:        - Observe patient in GI recovery unit for possible                          discharge same day.                         - Resume previous diet.                         - Follow pathology results                                                   - Follow in GI clinic for further management                         - The findings and recommendations were discussed with                          the patient.                         - Written discharge instructions were provided to the                          patient.                                                                                      Electronically signed by Dr. Irwin Jean  _______________________  BEBO JEAN MD  1/28/2025 1:14:04 PM  I was physically present for the entire viewing portion of the exam.  __________________________  Signature of teaching physician  Kulwant/Iris JEAN MD    __________________  JG HONG,   Number of Addenda: 0    Note Initiated On: 1/21/2025 11:27 AM  Scope In:  Scope Out:       Upper EUS 01/21/2025    Final                    Value:[address] [place] [address] [phone]     Endoscopy Department  _______________________________________________________________________________  Patient Name: Alvin Parra             Procedure Date: 1/21/2025 11:17 AM  MRN: [id number]                       Account Number: [id number]  YOB: 1940              Admit Type: Outpatient  Age: 84                               Room: [place] [address]  Gender: Male                          Note Status: Finalized  Attending MD: BEBO JEAN MD,  Pause for the Cause: completed  Total Sedation Time:                    _______________________________________________________________________________     Procedure:             Upper EUS  Indications:           Abnormal abdominal/pelvic CT scan. 83 yo M with a PMH                          of chronic abd pain and weight loss. He underwent EGD                          on 9/30/24 that noted gastric antral submucosal nodule                                                    but biopsies were non-diagnostic. 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 Borderline distended distal                          descending duodenum/proximal 3rd portion of the                          duodenum with narrowing of the 3rd portion of the                           duodenum between the abdominal aorta and the SMA/SMV                          without evidence for high-grade obstruction at this                          level.  Providers:             BEBO JEAN MD, Shwetha Villalta RN,                          JG Villagomez MD:          ERIBERTO SALGUERO  Medicines:             Monitored Anesthesia Care  Complications:         No immediate complications.  _______________________________________________________________________________  Procedure:             Pre-Ane                          sthesia Assessment:                         - Prior to the procedure, a History and Physical was                          performed, and patient medications, allergies and                          sensitivities were reviewed. The patient's tolerance                          of previous anesthesia was reviewed.                         - ASA Grade Assessment: II - A patient with mild                          systemic disease.                         After obtaining informed consent, the endoscope was                          passed under direct vision. Throughout the procedure,                          the patient's blood pressure, pulse, and oxygen                          saturations were monitored continuously. The                          Endosonoscope was introduced through the mouth, and                          advanced to the second part of duodenum. The upper EUS                          was accomplished without difficulty. The patient                          tolerated the proc                          edure well.                                                                                   Findings:       ENDOSONOGRAPHIC FINDING: :       An oval mass was identified endosonographically in the perigastric        peritoneal space. The mass was hypoechoic and contained shadowing        hyperechoic foci consistent with calcifications. The mass  measured 23 mm        by 16 mm in maximal cross-sectional diameter. The endosonographic        borders were well-defined with an intact interface between the mass and        the stomach excluding a gastric origin. Fine needle aspiration for        cytology was performed. Color Doppler imaging was utilized prior to        needle puncture to confirm a lack of significant vascular structures        within the needle path. Two passes were made with the 22 gauge EchoTip        needle using a transgastric approach. A cytologist was present and        performed a preliminary cytologic examination. The cellularity of the        specimen was adequate. F                          inal cytology results are pending. Fine needle        biopsy was then performed. Color Doppler imaging was utilized prior to        needle puncture to confirm a lack of significant vascular structures        within the needle path. Two passes were made with the 22 gauge Acquire-S        biopsy needle using a transgastric approach. A visible core of tissue        was obtained. Final cytology results are pending.       A focal anechoic cystic lesion/dilation was seen in the distal common        bile duct near the major papilla which is consistent with a choledochal        cyst. The lesion measured 9 mm by 8 mm in maximal cross-sectional        diameter.       The bile duct was non-dilated and measured 6.3 mm in maximal diameter.        The gallbladder could not be visualized.       The pancreatic parenchyma appeared normal. There were no features of        chronic pancreatitis. No masses, cysts or stones were visualized in the        pancreatic parenchyma. The main pancreatic duct was fo                          llowed from the        major papilla to the body, excluding pancreas divisum. The pancreatic        duct measured 2.9 mm in the head, 4.8 mm in the body and 2.3 mm in the        body of the pancreas.       No lymph nodes were seen in the upper  abdomen and mediastinum.       No masses were seen in the visualized portions of the liver.       The left adrenal appeared normal.                                                                                   Impression:            - Hypoechoic perigastric mass with hyperechoic                          shadowing foci suggestive of calcification that was                          separate from the stomach wall. Fine needle aspiration                          (using 22 gauge EchoTip) and biopsy (22 gauge Acquire)                          revealed visible tissue with calcification of unclear                          significance (not preliminariliy suggestive of sarcome                          or GIST, suggestive of inflammatory etiology).                                                   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 This                          is not felt to be of clinical significance.  Recommendation:        - Return patient to hospital [facility] for possible                          discharge same day.                         - Resume previous diet.                         - Follow pathology results                         - Follow in GI clinic for further management                         - The findings and recommendations were discussed with                          the patient.                         - Written discharge instructions were provided to the                          patient.                                                                                     Electronically signed by Dr. Irwin Jean  _______________________  BEBO JEAN MD  1/28/2025 1:16:00 PM  I was physically present for the entire viewing portion of the exam.  _                          _________________________  Signature of teaching physician  MELISSAc/Iris JEAN MD    __________________  JG HONG,   Number of  Addenda: 0    Note Initiated On: 1/21/2025 11:17 AM  Scope In:  Scope Out:           IMAGING:  EXAMINATION: XR ESOPHAGRAM SINGLE CONTRAST, 7/30/2024 3:48 PM     History: 84 yM with progressive dysphagia - eval for causes.; Weight  loss; Dysphagia, unspecified type; Abdominal pain, generalized;  Constipation, unspecified constipation type     Comparison: CT 5/15/2024     Fluoro time: 1.1 minutes.     Prep: NPO for 8 hours prior to the exam.     Technique: A metallic marker was taped to the middle of the patient's  chest. While standing, the patient ingested approximately 200-270 mL  of low density barium over 45-60 seconds. A barium tablet was given,  however the patient was unable to swallow after multiple attempts.      Findings:   Examination of the esophagus reveals no focal strictures, masses or  mucosal abnormalities. The gastroesophageal junction is patent. No  hiatal hernia. Fluoroscopic image was obtained at 1 minute  demonstrating no column of retained barium in the esophagus.     Patient was given a barium tablet; however, he was unable to advanced  the pill passed the oral pharyngeal phase despite multiple attempts  with liquid wash.                                                                      Impression:   Normal timed esophagram with no evidence of barium column at 1 minute.  Patient was unable to perform the barium tablet swallow despite  multiple attempts.     I have personally reviewed the examination and initial interpretation  and I agree with the findings.       Examination:  Modified Barium Swallow Study with Speech Pathology,  5/8/2025     Comparison: Barium swallow study 9/6/2024.     History: 85-year-old with dysphagia     Fluoroscopy time: 3.9  minutes.     Findings: Under fluoroscopic guidance and in the presence of the  speech pathology team, the patient was administered barium in various  consistencies: thin and mildly thickened liquids, pudding, solid  bolus, and half of a barium  tablet. Diffuse pharyngeal residue  observed across all consistencies, partially cleared with secondary  swallows.     Laryngeal penetration without aspiration occurred with thin and mildly  thickened liquids, and trace penetration was seen with pudding. Mild  aspiration was noted with the solid bolus.     The patient was unable to swallow half of a barium tablet with water.      AP imaging revealed a rightward bolus preference with pudding and  symmetric transit with thin liquids. Limited esophageal sweep showed  transient mid-esophageal stasis, which cleared with a thin liquid  rinse. The gastroesophageal junction was patent.                                                                      Impression:  1. Diffuse pharyngeal residue, intermittent laryngeal penetration and  mild aspiration; improved clearance with secondary swallows and  compensatory strategies.     2. No evidence of significant esophageal obstruction.     3. Please see the speech pathologist report for further details.      Again, thank you for allowing me to participate in the care of your patient.        Sincerely,        Malvin Contreras MD    Electronically signed

## 2025-08-05 ENCOUNTER — PATIENT OUTREACH (OUTPATIENT)
Dept: GASTROENTEROLOGY | Facility: CLINIC | Age: 85
End: 2025-08-05
Payer: COMMERCIAL

## 2025-08-07 RX ORDER — ONDANSETRON 4 MG/1
4 TABLET, ORALLY DISINTEGRATING ORAL EVERY 30 MIN PRN
Status: CANCELLED | OUTPATIENT
Start: 2025-08-07

## 2025-08-07 RX ORDER — OXYCODONE HYDROCHLORIDE 5 MG/1
5 TABLET ORAL
Refills: 0 | Status: CANCELLED | OUTPATIENT
Start: 2025-08-07

## 2025-08-07 RX ORDER — ONDANSETRON 2 MG/ML
4 INJECTION INTRAMUSCULAR; INTRAVENOUS EVERY 30 MIN PRN
Status: CANCELLED | OUTPATIENT
Start: 2025-08-07

## 2025-08-07 RX ORDER — NALOXONE HYDROCHLORIDE 0.4 MG/ML
0.1 INJECTION, SOLUTION INTRAMUSCULAR; INTRAVENOUS; SUBCUTANEOUS
Status: CANCELLED | OUTPATIENT
Start: 2025-08-07

## 2025-08-07 RX ORDER — DEXAMETHASONE SODIUM PHOSPHATE 4 MG/ML
4 INJECTION, SOLUTION INTRA-ARTICULAR; INTRALESIONAL; INTRAMUSCULAR; INTRAVENOUS; SOFT TISSUE
Status: CANCELLED | OUTPATIENT
Start: 2025-08-07

## 2025-08-07 RX ORDER — OXYCODONE HYDROCHLORIDE 10 MG/1
10 TABLET ORAL
Refills: 0 | Status: CANCELLED | OUTPATIENT
Start: 2025-08-07

## 2025-08-08 ENCOUNTER — HOSPITAL ENCOUNTER (OUTPATIENT)
Facility: CLINIC | Age: 85
Setting detail: OBSERVATION
Discharge: HOME | End: 2025-08-09
Attending: INTERNAL MEDICINE
Payer: COMMERCIAL

## 2025-08-08 PROBLEM — K22.0 CRICOPHARYNGEAL ACHALASIA: Status: ACTIVE | Noted: 2025-08-08

## 2025-08-08 LAB
ERYTHROCYTE [DISTWIDTH] IN BLOOD BY AUTOMATED COUNT: 12.1 % (ref 10–15)
HCT VFR BLD AUTO: 41.8 % (ref 40–53)
HGB BLD-MCNC: 14.3 G/DL (ref 13.3–17.7)
MCH RBC QN AUTO: 31.7 PG (ref 26.5–33)
MCHC RBC AUTO-ENTMCNC: 34.2 G/DL (ref 31.5–36.5)
MCV RBC AUTO: 93 FL (ref 78–100)
PLATELET # BLD AUTO: 200 10E3/UL (ref 150–450)
RBC # BLD AUTO: 4.51 10E6/UL (ref 4.4–5.9)
UPPER GI ENDOSCOPY: NORMAL
WBC # BLD AUTO: 12.5 10E3/UL (ref 4–11)

## 2025-08-08 PROCEDURE — 360N000082 HC SURGERY LEVEL 2 W/ FLUORO, PER MIN: Performed by: INTERNAL MEDICINE

## 2025-08-08 PROCEDURE — 84100 ASSAY OF PHOSPHORUS: CPT | Performed by: PHYSICIAN ASSISTANT

## 2025-08-08 PROCEDURE — G0378 HOSPITAL OBSERVATION PER HR: HCPCS

## 2025-08-08 PROCEDURE — 99207 PR APP CREDIT; MD BILLING SHARED VISIT: CPT | Mod: FS

## 2025-08-08 PROCEDURE — C1769 GUIDE WIRE: HCPCS | Performed by: INTERNAL MEDICINE

## 2025-08-08 PROCEDURE — 85018 HEMOGLOBIN: CPT | Performed by: PHYSICIAN ASSISTANT

## 2025-08-08 PROCEDURE — 710N000010 HC RECOVERY PHASE 1, LEVEL 2, PER MIN: Performed by: INTERNAL MEDICINE

## 2025-08-08 PROCEDURE — 250N000025 HC SEVOFLURANE, PER MIN: Performed by: INTERNAL MEDICINE

## 2025-08-08 PROCEDURE — 83735 ASSAY OF MAGNESIUM: CPT | Performed by: PHYSICIAN ASSISTANT

## 2025-08-08 PROCEDURE — 272N000001 HC OR GENERAL SUPPLY STERILE: Performed by: INTERNAL MEDICINE

## 2025-08-08 PROCEDURE — 84134 ASSAY OF PREALBUMIN: CPT | Performed by: PHYSICIAN ASSISTANT

## 2025-08-08 PROCEDURE — 86140 C-REACTIVE PROTEIN: CPT | Performed by: PHYSICIAN ASSISTANT

## 2025-08-08 PROCEDURE — 82374 ASSAY BLOOD CARBON DIOXIDE: CPT | Performed by: PHYSICIAN ASSISTANT

## 2025-08-08 PROCEDURE — 93005 ELECTROCARDIOGRAM TRACING: CPT

## 2025-08-08 PROCEDURE — 999N000141 HC STATISTIC PRE-PROCEDURE NURSING ASSESSMENT: Performed by: INTERNAL MEDICINE

## 2025-08-08 PROCEDURE — 36415 COLL VENOUS BLD VENIPUNCTURE: CPT | Performed by: PHYSICIAN ASSISTANT

## 2025-08-08 PROCEDURE — 370N000017 HC ANESTHESIA TECHNICAL FEE, PER MIN: Performed by: INTERNAL MEDICINE

## 2025-08-08 PROCEDURE — 99223 1ST HOSP IP/OBS HIGH 75: CPT | Mod: FS | Performed by: PHYSICIAN ASSISTANT

## 2025-08-08 RX ORDER — ACETAMINOPHEN 325 MG/1
975 TABLET ORAL ONCE
Status: COMPLETED | OUTPATIENT
Start: 2025-08-08 | End: 2025-08-08

## 2025-08-08 RX ORDER — LIDOCAINE 40 MG/G
CREAM TOPICAL
Status: DISCONTINUED | OUTPATIENT
Start: 2025-08-08 | End: 2025-08-08 | Stop reason: HOSPADM

## 2025-08-08 RX ORDER — FENTANYL CITRATE 50 UG/ML
50 INJECTION, SOLUTION INTRAMUSCULAR; INTRAVENOUS EVERY 5 MIN PRN
Status: DISCONTINUED | OUTPATIENT
Start: 2025-08-08 | End: 2025-08-08 | Stop reason: HOSPADM

## 2025-08-08 RX ORDER — SODIUM CHLORIDE, SODIUM LACTATE, POTASSIUM CHLORIDE, CALCIUM CHLORIDE 600; 310; 30; 20 MG/100ML; MG/100ML; MG/100ML; MG/100ML
INJECTION, SOLUTION INTRAVENOUS CONTINUOUS
Status: DISCONTINUED | OUTPATIENT
Start: 2025-08-08 | End: 2025-08-08

## 2025-08-08 RX ORDER — NALOXONE HYDROCHLORIDE 0.4 MG/ML
0.2 INJECTION, SOLUTION INTRAMUSCULAR; INTRAVENOUS; SUBCUTANEOUS
Status: DISCONTINUED | OUTPATIENT
Start: 2025-08-08 | End: 2025-08-09 | Stop reason: HOSPADM

## 2025-08-08 RX ORDER — SODIUM CHLORIDE, SODIUM LACTATE, POTASSIUM CHLORIDE, CALCIUM CHLORIDE 600; 310; 30; 20 MG/100ML; MG/100ML; MG/100ML; MG/100ML
INJECTION, SOLUTION INTRAVENOUS CONTINUOUS
Status: DISCONTINUED | OUTPATIENT
Start: 2025-08-08 | End: 2025-08-08 | Stop reason: HOSPADM

## 2025-08-08 RX ORDER — ONDANSETRON 2 MG/ML
4 INJECTION INTRAMUSCULAR; INTRAVENOUS EVERY 30 MIN PRN
Status: DISCONTINUED | OUTPATIENT
Start: 2025-08-08 | End: 2025-08-08 | Stop reason: HOSPADM

## 2025-08-08 RX ORDER — AMOXICILLIN 250 MG
2 CAPSULE ORAL 2 TIMES DAILY PRN
Status: DISCONTINUED | OUTPATIENT
Start: 2025-08-08 | End: 2025-08-09 | Stop reason: HOSPADM

## 2025-08-08 RX ORDER — NALOXONE HYDROCHLORIDE 0.4 MG/ML
0.1 INJECTION, SOLUTION INTRAMUSCULAR; INTRAVENOUS; SUBCUTANEOUS
Status: DISCONTINUED | OUTPATIENT
Start: 2025-08-08 | End: 2025-08-08 | Stop reason: HOSPADM

## 2025-08-08 RX ORDER — ONDANSETRON 4 MG/1
4 TABLET, ORALLY DISINTEGRATING ORAL EVERY 30 MIN PRN
Status: DISCONTINUED | OUTPATIENT
Start: 2025-08-08 | End: 2025-08-08 | Stop reason: HOSPADM

## 2025-08-08 RX ORDER — HYDROMORPHONE HYDROCHLORIDE 1 MG/ML
0.2 INJECTION, SOLUTION INTRAMUSCULAR; INTRAVENOUS; SUBCUTANEOUS EVERY 5 MIN PRN
Status: DISCONTINUED | OUTPATIENT
Start: 2025-08-08 | End: 2025-08-08 | Stop reason: HOSPADM

## 2025-08-08 RX ORDER — DEXAMETHASONE SODIUM PHOSPHATE 4 MG/ML
4 INJECTION, SOLUTION INTRA-ARTICULAR; INTRALESIONAL; INTRAMUSCULAR; INTRAVENOUS; SOFT TISSUE
Status: DISCONTINUED | OUTPATIENT
Start: 2025-08-08 | End: 2025-08-08 | Stop reason: HOSPADM

## 2025-08-08 RX ORDER — NALOXONE HYDROCHLORIDE 0.4 MG/ML
0.4 INJECTION, SOLUTION INTRAMUSCULAR; INTRAVENOUS; SUBCUTANEOUS
Status: DISCONTINUED | OUTPATIENT
Start: 2025-08-08 | End: 2025-08-09 | Stop reason: HOSPADM

## 2025-08-08 RX ORDER — CALCIUM CARBONATE 500 MG/1
1000 TABLET, CHEWABLE ORAL 4 TIMES DAILY PRN
Status: DISCONTINUED | OUTPATIENT
Start: 2025-08-08 | End: 2025-08-09 | Stop reason: HOSPADM

## 2025-08-08 RX ORDER — FENTANYL CITRATE 50 UG/ML
25 INJECTION, SOLUTION INTRAMUSCULAR; INTRAVENOUS EVERY 5 MIN PRN
Status: DISCONTINUED | OUTPATIENT
Start: 2025-08-08 | End: 2025-08-08 | Stop reason: HOSPADM

## 2025-08-08 RX ORDER — LIDOCAINE 40 MG/G
CREAM TOPICAL
Status: DISCONTINUED | OUTPATIENT
Start: 2025-08-08 | End: 2025-08-09 | Stop reason: HOSPADM

## 2025-08-08 RX ORDER — DEXTROSE MONOHYDRATE 100 MG/ML
INJECTION, SOLUTION INTRAVENOUS CONTINUOUS PRN
Status: DISCONTINUED | OUTPATIENT
Start: 2025-08-08 | End: 2025-08-09 | Stop reason: HOSPADM

## 2025-08-08 RX ORDER — HYDROMORPHONE HYDROCHLORIDE 1 MG/ML
0.4 INJECTION, SOLUTION INTRAMUSCULAR; INTRAVENOUS; SUBCUTANEOUS EVERY 5 MIN PRN
Status: DISCONTINUED | OUTPATIENT
Start: 2025-08-08 | End: 2025-08-08 | Stop reason: HOSPADM

## 2025-08-08 RX ORDER — FLUMAZENIL 0.1 MG/ML
0.2 INJECTION, SOLUTION INTRAVENOUS
Status: ACTIVE | OUTPATIENT
Start: 2025-08-08 | End: 2025-08-09

## 2025-08-08 RX ORDER — AMOXICILLIN 250 MG
1 CAPSULE ORAL 2 TIMES DAILY PRN
Status: DISCONTINUED | OUTPATIENT
Start: 2025-08-08 | End: 2025-08-09 | Stop reason: HOSPADM

## 2025-08-08 RX ORDER — ONDANSETRON 2 MG/ML
4 INJECTION INTRAMUSCULAR; INTRAVENOUS
Status: DISCONTINUED | OUTPATIENT
Start: 2025-08-08 | End: 2025-08-08 | Stop reason: HOSPADM

## 2025-08-08 ASSESSMENT — ACTIVITIES OF DAILY LIVING (ADL)
ADLS_ACUITY_SCORE: 38
ADLS_ACUITY_SCORE: 36
ADLS_ACUITY_SCORE: 38
ADLS_ACUITY_SCORE: 36
ADLS_ACUITY_SCORE: 38
ADLS_ACUITY_SCORE: 35
ADLS_ACUITY_SCORE: 38
ADLS_ACUITY_SCORE: 36
ADLS_ACUITY_SCORE: 38
ADLS_ACUITY_SCORE: 36
ADLS_ACUITY_SCORE: 38
ADLS_ACUITY_SCORE: 38
ADLS_ACUITY_SCORE: 36
ADLS_ACUITY_SCORE: 38
ADLS_ACUITY_SCORE: 36
ADLS_ACUITY_SCORE: 38

## 2025-08-08 ASSESSMENT — COLUMBIA-SUICIDE SEVERITY RATING SCALE - C-SSRS
1. IN THE PAST MONTH, HAVE YOU WISHED YOU WERE DEAD OR WISHED YOU COULD GO TO SLEEP AND NOT WAKE UP?: NO
2. HAVE YOU ACTUALLY HAD ANY THOUGHTS OF KILLING YOURSELF IN THE PAST MONTH?: NO
6. HAVE YOU EVER DONE ANYTHING, STARTED TO DO ANYTHING, OR PREPARED TO DO ANYTHING TO END YOUR LIFE?: NO

## 2025-08-09 VITALS
TEMPERATURE: 97.8 F | HEART RATE: 66 BPM | BODY MASS INDEX: 17.53 KG/M2 | RESPIRATION RATE: 18 BRPM | DIASTOLIC BLOOD PRESSURE: 86 MMHG | SYSTOLIC BLOOD PRESSURE: 123 MMHG | OXYGEN SATURATION: 96 % | WEIGHT: 132.28 LBS | HEIGHT: 73 IN

## 2025-08-09 LAB
ANION GAP SERPL CALCULATED.3IONS-SCNC: 10 MMOL/L (ref 7–15)
BUN SERPL-MCNC: 17.3 MG/DL (ref 8–23)
CALCIUM SERPL-MCNC: 9.2 MG/DL (ref 8.8–10.4)
CHLORIDE SERPL-SCNC: 100 MMOL/L (ref 98–107)
CREAT SERPL-MCNC: 0.84 MG/DL (ref 0.67–1.17)
CRP SERPL-MCNC: <3 MG/L
EGFRCR SERPLBLD CKD-EPI 2021: 85 ML/MIN/1.73M2
GLUCOSE BLDC GLUCOMTR-MCNC: 122 MG/DL (ref 70–99)
GLUCOSE SERPL-MCNC: 140 MG/DL (ref 70–99)
HCO3 SERPL-SCNC: 26 MMOL/L (ref 22–29)
MAGNESIUM SERPL-MCNC: 2 MG/DL (ref 1.7–2.3)
PHOSPHATE SERPL-MCNC: 4.4 MG/DL (ref 2.5–4.5)
POTASSIUM SERPL-SCNC: 4.7 MMOL/L (ref 3.4–5.3)
PREALB SERPL-MCNC: 23.7 MG/DL (ref 20–40)
SODIUM SERPL-SCNC: 136 MMOL/L (ref 135–145)

## 2025-08-09 PROCEDURE — G0378 HOSPITAL OBSERVATION PER HR: HCPCS

## 2025-08-09 PROCEDURE — 99239 HOSP IP/OBS DSCHRG MGMT >30: CPT | Mod: FS | Performed by: PHYSICIAN ASSISTANT

## 2025-08-09 PROCEDURE — 96374 THER/PROPH/DIAG INJ IV PUSH: CPT

## 2025-08-09 PROCEDURE — 250N000011 HC RX IP 250 OP 636: Performed by: PHYSICIAN ASSISTANT

## 2025-08-09 PROCEDURE — 99207 PR APP CREDIT; MD BILLING SHARED VISIT: CPT | Performed by: ORTHOPAEDIC SURGERY

## 2025-08-09 PROCEDURE — 82962 GLUCOSE BLOOD TEST: CPT

## 2025-08-09 RX ORDER — HYDROMORPHONE HYDROCHLORIDE 1 MG/ML
0.3 INJECTION, SOLUTION INTRAMUSCULAR; INTRAVENOUS; SUBCUTANEOUS
Status: DISCONTINUED | OUTPATIENT
Start: 2025-08-09 | End: 2025-08-09 | Stop reason: HOSPADM

## 2025-08-09 RX ADMIN — PANTOPRAZOLE SODIUM 40 MG: 40 INJECTION, POWDER, FOR SOLUTION INTRAVENOUS at 10:07

## 2025-08-09 ASSESSMENT — ACTIVITIES OF DAILY LIVING (ADL)
ADLS_ACUITY_SCORE: 51
ADLS_ACUITY_SCORE: 36
ADLS_ACUITY_SCORE: 51

## 2025-08-13 LAB
ATRIAL RATE - MUSE: 72 BPM
DIASTOLIC BLOOD PRESSURE - MUSE: NORMAL MMHG
INTERPRETATION ECG - MUSE: NORMAL
P AXIS - MUSE: 77 DEGREES
PR INTERVAL - MUSE: 180 MS
QRS DURATION - MUSE: 110 MS
QT - MUSE: 384 MS
QTC - MUSE: 420 MS
R AXIS - MUSE: 34 DEGREES
SYSTOLIC BLOOD PRESSURE - MUSE: NORMAL MMHG
T AXIS - MUSE: 58 DEGREES
VENTRICULAR RATE- MUSE: 72 BPM

## (undated) DEVICE — DEVICE BRIDLE PRO NASAL 12FR 4-4212

## (undated) DEVICE — TUBE NASOGASTRIC FEEDING CORFLO ENFIT 12FRX22" 50-4602

## (undated) DEVICE — ENDO TUBING CO2 SMARTCAP STERILE DISP 100145CO2EXT

## (undated) DEVICE — SOL WATER IRRIG 1000ML BOTTLE 2F7114

## (undated) DEVICE — SYR 03ML LL W/O NDL 309657

## (undated) DEVICE — NDL BUTTERFLY 25GA .75" 367298

## (undated) DEVICE — GUIDEWIRE TERUMO .035X260 3CM STR TIP GS3504

## (undated) DEVICE — SPONGE RAY-TEC 4X8" 7318

## (undated) DEVICE — KIT ENDO FIRST STEP DISINFECTANT 200ML W/POUCH EP-4

## (undated) DEVICE — JELLY LUBRICATING SURGILUBE 2OZ TUBE

## (undated) DEVICE — NDL SCLEROTHERAPY 25GA CARR-LOCK  00711811

## (undated) DEVICE — ENDO FORCEP BX CAPTURA PRO SPIKE G50696

## (undated) DEVICE — Device

## (undated) DEVICE — GOWN IMPERVIOUS 2XL BLUE

## (undated) DEVICE — KIT ENDO TURNOVER/PROCEDURE CARRY-ON 101822

## (undated) DEVICE — SUCTION MANIFOLD NEPTUNE 2 SYS 4 PORT 0702-020-000

## (undated) DEVICE — SPECIMEN CONTAINER 3OZ W/FORMALIN 59901

## (undated) DEVICE — SYR 50ML SLIP TIP W/O NDL 309654

## (undated) DEVICE — GLOVE EXAM NITRILE LG PF LATEX FREE 5064

## (undated) DEVICE — ESU GROUND PAD ADULT W/CORD E7507

## (undated) DEVICE — LABEL MEDICATION SYSTEM 3303-P

## (undated) DEVICE — CLIP HEMOSTATIC RESOLN DURACLIP DC0235

## (undated) DEVICE — SPONGE COTTONOID 1/2X3" 80-1407

## (undated) DEVICE — PACK ENT ENDOSCOPY UMMC

## (undated) DEVICE — PAD CHUX UNDERPAD 23X24" 7136

## (undated) DEVICE — ENDO TOOTH GUARD SAC2001

## (undated) DEVICE — BITE BLOCK ENDO W/DENTAL RIM 54FR SBT-114-100

## (undated) DEVICE — ESU KNIFE TRIANGLE TIP 4.5X165MM KD-640L

## (undated) DEVICE — ENDO BITE BLOCK ADULT OMNI-BLOC

## (undated) DEVICE — PACK ENDOSCOPY GI CUSTOM UMMC

## (undated) DEVICE — NDL BLUNT 18GA 1" W/O FILTER 305181

## (undated) DEVICE — SOL WATER IRRIG 500ML BOTTLE 2F7113

## (undated) DEVICE — SUCTION MANIFOLD NEPTUNE 2 SYS 1 PORT 702-025-000

## (undated) DEVICE — SYR 01ML 27GA 0.5" NDL TBC 309623

## (undated) DEVICE — TUBING SUCTION MEDI-VAC 1/4"X20' N620A

## (undated) DEVICE — KIT CONNECTOR FOR OLYMPUS ENDOSCOPES DEFENDO 100310

## (undated) DEVICE — GLOVE BIOGEL PI ULTRATOUCH SZ 6.5 41165

## (undated) DEVICE — ENDO CAP AND TUBING STERILE FOR ENDOGATOR  100130

## (undated) DEVICE — BIOPSY VALVE BIOSHIELD 00711135

## (undated) DEVICE — TUBING SUCTION MEDI-VAC SOFT 3/16"X20' N520A

## (undated) DEVICE — LINEN TOWEL PACK X5 5464

## (undated) DEVICE — LIFTER SURGICAL ASCENDO SUBMUCOSAL LIFT AGENT BX00712934

## (undated) DEVICE — ENDO SEALING CAP SMARTCAP

## (undated) DEVICE — SOL NACL 0.9% IRRIG 1000ML BOTTLE 2F7124

## (undated) RX ORDER — ESMOLOL HYDROCHLORIDE 10 MG/ML
INJECTION INTRAVENOUS
Status: DISPENSED
Start: 2025-03-26

## (undated) RX ORDER — LABETALOL HYDROCHLORIDE 5 MG/ML
INJECTION, SOLUTION INTRAVENOUS
Status: DISPENSED
Start: 2025-03-26

## (undated) RX ORDER — FENTANYL CITRATE-0.9 % NACL/PF 10 MCG/ML
PLASTIC BAG, INJECTION (ML) INTRAVENOUS
Status: DISPENSED
Start: 2025-01-21

## (undated) RX ORDER — FENTANYL CITRATE 50 UG/ML
INJECTION, SOLUTION INTRAMUSCULAR; INTRAVENOUS
Status: DISPENSED
Start: 2025-03-26

## (undated) RX ORDER — FENTANYL CITRATE 50 UG/ML
INJECTION, SOLUTION INTRAMUSCULAR; INTRAVENOUS
Status: DISPENSED
Start: 2025-08-08

## (undated) RX ORDER — ONDANSETRON 2 MG/ML
INJECTION INTRAMUSCULAR; INTRAVENOUS
Status: DISPENSED
Start: 2025-08-08

## (undated) RX ORDER — ONDANSETRON 2 MG/ML
INJECTION INTRAMUSCULAR; INTRAVENOUS
Status: DISPENSED
Start: 2025-03-26

## (undated) RX ORDER — DEXAMETHASONE SODIUM PHOSPHATE 4 MG/ML
INJECTION, SOLUTION INTRA-ARTICULAR; INTRALESIONAL; INTRAMUSCULAR; INTRAVENOUS; SOFT TISSUE
Status: DISPENSED
Start: 2025-03-26

## (undated) RX ORDER — PROPOFOL 10 MG/ML
INJECTION, EMULSION INTRAVENOUS
Status: DISPENSED
Start: 2025-03-26

## (undated) RX ORDER — GLYCOPYRROLATE 0.2 MG/ML
INJECTION, SOLUTION INTRAMUSCULAR; INTRAVENOUS
Status: DISPENSED
Start: 2025-03-26

## (undated) RX ORDER — EPHEDRINE SULFATE 50 MG/ML
INJECTION, SOLUTION INTRAMUSCULAR; INTRAVENOUS; SUBCUTANEOUS
Status: DISPENSED
Start: 2025-03-26

## (undated) RX ORDER — FENTANYL CITRATE-0.9 % NACL/PF 10 MCG/ML
PLASTIC BAG, INJECTION (ML) INTRAVENOUS
Status: DISPENSED
Start: 2025-03-26

## (undated) RX ORDER — IOPAMIDOL 510 MG/ML
INJECTION, SOLUTION INTRAVASCULAR
Status: DISPENSED
Start: 2025-08-08

## (undated) RX ORDER — ATROPINE SULFATE 0.4 MG/ML
AMPUL (ML) INJECTION
Status: DISPENSED
Start: 2025-03-26

## (undated) RX ORDER — EPHEDRINE SULFATE 50 MG/ML
INJECTION, SOLUTION INTRAMUSCULAR; INTRAVENOUS; SUBCUTANEOUS
Status: DISPENSED
Start: 2025-08-08